# Patient Record
Sex: MALE | Race: WHITE | NOT HISPANIC OR LATINO | Employment: OTHER | URBAN - METROPOLITAN AREA
[De-identification: names, ages, dates, MRNs, and addresses within clinical notes are randomized per-mention and may not be internally consistent; named-entity substitution may affect disease eponyms.]

---

## 2023-05-01 ENCOUNTER — TELEPHONE (OUTPATIENT)
Dept: PRIMARY CARE | Facility: CLINIC | Age: 68
End: 2023-05-01
Payer: MEDICARE

## 2023-05-01 DIAGNOSIS — I10 PRIMARY HYPERTENSION: Primary | ICD-10-CM

## 2023-05-01 DIAGNOSIS — K21.9 GASTROESOPHAGEAL REFLUX DISEASE WITHOUT ESOPHAGITIS: ICD-10-CM

## 2023-05-01 RX ORDER — APIXABAN 5 MG/1
1 TABLET, FILM COATED ORAL 2 TIMES DAILY
COMMUNITY
Start: 2022-06-13 | End: 2023-09-22 | Stop reason: ALTCHOICE

## 2023-05-01 RX ORDER — PANTOPRAZOLE SODIUM 40 MG/1
1 TABLET, DELAYED RELEASE ORAL
COMMUNITY
Start: 2017-09-29 | End: 2023-05-01 | Stop reason: SDUPTHER

## 2023-05-01 RX ORDER — ALPRAZOLAM 0.5 MG/1
1 TABLET ORAL 3 TIMES DAILY PRN
COMMUNITY
Start: 2021-10-06 | End: 2023-07-26 | Stop reason: SDUPTHER

## 2023-05-01 RX ORDER — METOPROLOL SUCCINATE 100 MG/1
1 TABLET, EXTENDED RELEASE ORAL DAILY
COMMUNITY
Start: 2014-04-28 | End: 2023-05-01 | Stop reason: SDUPTHER

## 2023-05-01 RX ORDER — METOPROLOL SUCCINATE 100 MG/1
100 TABLET, EXTENDED RELEASE ORAL DAILY
Qty: 90 TABLET | Refills: 3 | Status: SHIPPED | OUTPATIENT
Start: 2023-05-01 | End: 2024-04-29

## 2023-05-01 RX ORDER — FAMOTIDINE 20 MG/1
1 TABLET, FILM COATED ORAL NIGHTLY
COMMUNITY
Start: 2022-06-24 | End: 2024-02-07 | Stop reason: SDUPTHER

## 2023-05-01 RX ORDER — PANTOPRAZOLE SODIUM 40 MG/1
40 TABLET, DELAYED RELEASE ORAL
Qty: 180 TABLET | Refills: 3 | Status: SHIPPED | OUTPATIENT
Start: 2023-05-01 | End: 2024-04-15 | Stop reason: SDUPTHER

## 2023-05-01 NOTE — TELEPHONE ENCOUNTER
Patient requesting a refill on Metoprolol 100mg and pantoprazole 40mg to [facility]

## 2023-05-17 PROBLEM — M48.061 SPINAL STENOSIS OF LUMBAR REGION: Status: ACTIVE | Noted: 2023-05-17

## 2023-05-17 PROBLEM — K21.9 GASTROESOPHAGEAL REFLUX DISEASE WITHOUT ESOPHAGITIS: Status: ACTIVE | Noted: 2023-05-17

## 2023-05-17 PROBLEM — D12.6 TUBULAR ADENOMA OF COLON: Status: ACTIVE | Noted: 2023-05-17

## 2023-05-17 PROBLEM — I48.20 CHRONIC ATRIAL FIBRILLATION (MULTI): Status: ACTIVE | Noted: 2023-05-17

## 2023-05-17 PROBLEM — I47.9 PAROXYSMAL TACHYCARDIA (MULTI): Status: RESOLVED | Noted: 2023-05-17 | Resolved: 2023-05-17

## 2023-05-17 PROBLEM — M79.7 FIBROMYALGIA: Status: ACTIVE | Noted: 2023-05-17

## 2023-05-17 PROBLEM — I47.9 PAROXYSMAL TACHYCARDIA (MULTI): Status: ACTIVE | Noted: 2023-05-17

## 2023-05-17 PROBLEM — M79.2 NEUROPATHIC PAIN: Status: ACTIVE | Noted: 2023-05-17

## 2023-05-17 PROBLEM — I10 PRIMARY HYPERTENSION: Status: ACTIVE | Noted: 2023-05-17

## 2023-05-17 NOTE — PROGRESS NOTES
Subjective   Patient ID: Anirudh Qiu is a 68 y.o. male who presents for Back Pain.    Is here to follow-up on hypertension, fibromyalgia, neuropathic pain spinal stenosis of the lumbar spine, GERD, and a history of atrial fibrillation.  His radiofrequency ablation seems to be successful as he remains in sinus rhythm.    His physical health has been good other than the severe upper back sensitivity.  We discussed getting another opinion from physical medicine and rehabilitation         Review of Systems   Cardiovascular:         See HPI   Neurological:         See HPI   All other systems reviewed and are negative.      Objective   /82 (BP Location: Left arm, Patient Position: Sitting, BP Cuff Size: Large adult)   Pulse 74   Temp 36.2 °C (97.2 °F) (Temporal)   SpO2 99%     Physical Exam  Constitutional:       Appearance: Normal appearance.   HENT:      Head: Normocephalic and atraumatic.   Cardiovascular:      Rate and Rhythm: Normal rate and regular rhythm.   Pulmonary:      Effort: Pulmonary effort is normal.      Breath sounds: Normal breath sounds.   Musculoskeletal:      Cervical back: Normal range of motion and neck supple.   Neurological:      General: No focal deficit present.      Mental Status: He is alert and oriented to person, place, and time.   Psychiatric:         Mood and Affect: Mood normal.         Behavior: Behavior normal.         Thought Content: Thought content normal.         Judgment: Judgment normal.         Assessment/Plan   Problem List Items Addressed This Visit       Primary hypertension    Fibromyalgia    Neuropathic pain    Relevant Orders    Referral to Pain Medicine    Spinal stenosis of lumbar region - Primary    Gastroesophageal reflux disease without esophagitis    Chronic atrial fibrillation (CMS/HCC)     Followed by cardiology and is anticoagulated        I am referring him to physical medicine and rehabilitation for a new idea to help with his ultrasensitive upper  back pain.  I will follow-up with him in 4 months and we will update his chemistry at that time.

## 2023-05-18 ENCOUNTER — OFFICE VISIT (OUTPATIENT)
Dept: PRIMARY CARE | Facility: CLINIC | Age: 68
End: 2023-05-18
Payer: MEDICARE

## 2023-05-18 VITALS
HEART RATE: 74 BPM | TEMPERATURE: 97.2 F | SYSTOLIC BLOOD PRESSURE: 130 MMHG | DIASTOLIC BLOOD PRESSURE: 82 MMHG | OXYGEN SATURATION: 99 %

## 2023-05-18 DIAGNOSIS — I48.20 CHRONIC ATRIAL FIBRILLATION (MULTI): ICD-10-CM

## 2023-05-18 DIAGNOSIS — M79.7 FIBROMYALGIA: ICD-10-CM

## 2023-05-18 DIAGNOSIS — M79.2 NEUROPATHIC PAIN: ICD-10-CM

## 2023-05-18 DIAGNOSIS — I10 PRIMARY HYPERTENSION: ICD-10-CM

## 2023-05-18 DIAGNOSIS — K21.9 GASTROESOPHAGEAL REFLUX DISEASE WITHOUT ESOPHAGITIS: ICD-10-CM

## 2023-05-18 DIAGNOSIS — M48.061 SPINAL STENOSIS OF LUMBAR REGION, UNSPECIFIED WHETHER NEUROGENIC CLAUDICATION PRESENT: Primary | ICD-10-CM

## 2023-05-18 PROCEDURE — 1160F RVW MEDS BY RX/DR IN RCRD: CPT | Performed by: FAMILY MEDICINE

## 2023-05-18 PROCEDURE — 3075F SYST BP GE 130 - 139MM HG: CPT | Performed by: FAMILY MEDICINE

## 2023-05-18 PROCEDURE — 3079F DIAST BP 80-89 MM HG: CPT | Performed by: FAMILY MEDICINE

## 2023-05-18 PROCEDURE — 1036F TOBACCO NON-USER: CPT | Performed by: FAMILY MEDICINE

## 2023-05-18 PROCEDURE — 1159F MED LIST DOCD IN RCRD: CPT | Performed by: FAMILY MEDICINE

## 2023-05-18 PROCEDURE — 99213 OFFICE O/P EST LOW 20 MIN: CPT | Performed by: FAMILY MEDICINE

## 2023-05-18 ASSESSMENT — PATIENT HEALTH QUESTIONNAIRE - PHQ9
SUM OF ALL RESPONSES TO PHQ9 QUESTIONS 1 AND 2: 0
2. FEELING DOWN, DEPRESSED OR HOPELESS: NOT AT ALL
1. LITTLE INTEREST OR PLEASURE IN DOING THINGS: NOT AT ALL

## 2023-05-18 NOTE — PATIENT INSTRUCTIONS
I am referring you to physical medicine and rehabilitation to hopefully have a new idea how to control the pain of your upper back.  I will see you back in 4 months and we will update your major blood chemistry at that time.

## 2023-07-26 DIAGNOSIS — F41.8 SITUATIONAL ANXIETY: Primary | ICD-10-CM

## 2023-07-26 RX ORDER — ALPRAZOLAM 0.5 MG/1
0.5 TABLET ORAL 3 TIMES DAILY PRN
Qty: 10 TABLET | Refills: 0 | Status: SHIPPED | OUTPATIENT
Start: 2023-07-26 | End: 2024-06-05 | Stop reason: SDUPTHER

## 2023-09-19 ENCOUNTER — APPOINTMENT (OUTPATIENT)
Dept: PRIMARY CARE | Facility: CLINIC | Age: 68
End: 2023-09-19
Payer: MEDICARE

## 2023-09-20 ASSESSMENT — ENCOUNTER SYMPTOMS
TINGLING: 0
ABDOMINAL PAIN: 0
PERIANAL NUMBNESS: 0
PARESTHESIAS: 0
PARESIS: 0
HEADACHES: 0
BOWEL INCONTINENCE: 0
BACK PAIN: 1
FEVER: 0
WEIGHT LOSS: 0
NUMBNESS: 0
LEG PAIN: 1
DYSURIA: 0
WEAKNESS: 0

## 2023-09-22 ENCOUNTER — OFFICE VISIT (OUTPATIENT)
Dept: PRIMARY CARE | Facility: CLINIC | Age: 68
End: 2023-09-22
Payer: MEDICARE

## 2023-09-22 VITALS — TEMPERATURE: 98 F | SYSTOLIC BLOOD PRESSURE: 142 MMHG | DIASTOLIC BLOOD PRESSURE: 82 MMHG

## 2023-09-22 DIAGNOSIS — M54.6 CHRONIC THORACIC BACK PAIN, UNSPECIFIED BACK PAIN LATERALITY: ICD-10-CM

## 2023-09-22 DIAGNOSIS — Z23 NEEDS FLU SHOT: ICD-10-CM

## 2023-09-22 DIAGNOSIS — M79.89 LEFT LEG SWELLING: ICD-10-CM

## 2023-09-22 DIAGNOSIS — M79.605 LEFT LEG PAIN: ICD-10-CM

## 2023-09-22 DIAGNOSIS — Z00.00 ROUTINE GENERAL MEDICAL EXAMINATION AT A HEALTH CARE FACILITY: Primary | ICD-10-CM

## 2023-09-22 DIAGNOSIS — G89.29 CHRONIC THORACIC BACK PAIN, UNSPECIFIED BACK PAIN LATERALITY: ICD-10-CM

## 2023-09-22 PROBLEM — I48.0 PAROXYSMAL ATRIAL FIBRILLATION (MULTI): Status: ACTIVE | Noted: 2023-09-22

## 2023-09-22 PROBLEM — E04.9 GOITER: Status: ACTIVE | Noted: 2021-07-01

## 2023-09-22 PROBLEM — I10 BENIGN ESSENTIAL HYPERTENSION: Status: ACTIVE | Noted: 2023-09-22

## 2023-09-22 PROBLEM — S16.1XXA CERVICAL STRAIN: Status: ACTIVE | Noted: 2023-09-22

## 2023-09-22 PROBLEM — E78.00 ELEVATED LDL CHOLESTEROL LEVEL: Status: ACTIVE | Noted: 2023-09-22

## 2023-09-22 PROBLEM — S39.012A LUMBAR STRAIN: Status: ACTIVE | Noted: 2023-09-22

## 2023-09-22 PROBLEM — J34.2 NASAL SEPTAL DEVIATION: Status: ACTIVE | Noted: 2023-09-22

## 2023-09-22 PROBLEM — S86.819A STRAIN OF CALF MUSCLE: Status: ACTIVE | Noted: 2023-09-22

## 2023-09-22 PROBLEM — K29.60 BILE-INDUCED GASTRITIS: Status: ACTIVE | Noted: 2023-09-22

## 2023-09-22 PROBLEM — R49.8 OTHER VOICE AND RESONANCE DISORDERS: Status: ACTIVE | Noted: 2021-07-01

## 2023-09-22 PROBLEM — K80.20 CALCULUS OF GALLBLADDER WITHOUT CHOLECYSTITIS WITHOUT OBSTRUCTION: Status: ACTIVE | Noted: 2023-09-22

## 2023-09-22 PROBLEM — R13.12 DYSPHAGIA, OROPHARYNGEAL PHASE: Status: ACTIVE | Noted: 2023-09-22

## 2023-09-22 PROBLEM — E06.9 THYROIDITIS: Status: ACTIVE | Noted: 2021-07-01

## 2023-09-22 PROBLEM — L20.9 ATOPIC DERMATITIS OF SCALP: Status: ACTIVE | Noted: 2023-09-22

## 2023-09-22 PROBLEM — B37.0 CANDIDIASIS OF MOUTH: Status: ACTIVE | Noted: 2021-07-01

## 2023-09-22 PROBLEM — F43.21 SITUATIONAL DEPRESSION: Status: ACTIVE | Noted: 2023-09-22

## 2023-09-22 PROBLEM — K14.6 BURNING MOUTH SYNDROME: Status: ACTIVE | Noted: 2021-08-02

## 2023-09-22 PROBLEM — I48.19 PERSISTENT ATRIAL FIBRILLATION (MULTI): Status: ACTIVE | Noted: 2023-09-22

## 2023-09-22 PROBLEM — M79.662 PAIN AND SWELLING OF LEFT LOWER LEG: Status: ACTIVE | Noted: 2023-09-22

## 2023-09-22 PROBLEM — M54.14 RADICULAR PAIN OF THORACIC REGION: Status: ACTIVE | Noted: 2023-09-22

## 2023-09-22 PROBLEM — M54.12 CERVICAL RADICULOPATHY: Status: ACTIVE | Noted: 2023-09-22

## 2023-09-22 PROBLEM — K21.9 GERD (GASTROESOPHAGEAL REFLUX DISEASE): Status: ACTIVE | Noted: 2023-09-22

## 2023-09-22 PROBLEM — S29.019A STRAIN OF THORACIC REGION: Status: ACTIVE | Noted: 2023-09-22

## 2023-09-22 PROBLEM — G60.9 IDIOPATHIC PERIPHERAL NEUROPATHY: Status: ACTIVE | Noted: 2023-09-22

## 2023-09-22 PROBLEM — M48.00 SPINAL STENOSIS, MULTILEVEL: Status: ACTIVE | Noted: 2023-09-22

## 2023-09-22 PROBLEM — M54.9 UPPER BACK PAIN, CHRONIC: Status: ACTIVE | Noted: 2023-09-22

## 2023-09-22 PROBLEM — F41.8 SITUATIONAL ANXIETY: Status: ACTIVE | Noted: 2023-09-22

## 2023-09-22 PROBLEM — M79.18 MYOFASCIAL PAIN SYNDROME: Status: ACTIVE | Noted: 2023-09-22

## 2023-09-22 PROBLEM — K31.84 GASTRIC STASIS: Status: ACTIVE | Noted: 2023-09-22

## 2023-09-22 PROBLEM — M62.838 MUSCLE SPASTICITY: Status: ACTIVE | Noted: 2023-09-22

## 2023-09-22 PROBLEM — R35.1 NOCTURIA: Status: ACTIVE | Noted: 2023-09-22

## 2023-09-22 PROBLEM — M50.30 DEGENERATIVE CERVICAL DISC: Status: ACTIVE | Noted: 2023-09-22

## 2023-09-22 PROBLEM — I47.10 PSVT (PAROXYSMAL SUPRAVENTRICULAR TACHYCARDIA) (CMS-HCC): Status: ACTIVE | Noted: 2023-09-22

## 2023-09-22 PROCEDURE — 1036F TOBACCO NON-USER: CPT | Performed by: NURSE PRACTITIONER

## 2023-09-22 PROCEDURE — 1160F RVW MEDS BY RX/DR IN RCRD: CPT | Performed by: NURSE PRACTITIONER

## 2023-09-22 PROCEDURE — G0008 ADMIN INFLUENZA VIRUS VAC: HCPCS | Performed by: NURSE PRACTITIONER

## 2023-09-22 PROCEDURE — 1159F MED LIST DOCD IN RCRD: CPT | Performed by: NURSE PRACTITIONER

## 2023-09-22 PROCEDURE — 3077F SYST BP >= 140 MM HG: CPT | Performed by: NURSE PRACTITIONER

## 2023-09-22 PROCEDURE — 3079F DIAST BP 80-89 MM HG: CPT | Performed by: NURSE PRACTITIONER

## 2023-09-22 PROCEDURE — G0439 PPPS, SUBSEQ VISIT: HCPCS | Performed by: NURSE PRACTITIONER

## 2023-09-22 PROCEDURE — 90662 IIV NO PRSV INCREASED AG IM: CPT | Performed by: NURSE PRACTITIONER

## 2023-09-22 PROCEDURE — 99213 OFFICE O/P EST LOW 20 MIN: CPT | Performed by: NURSE PRACTITIONER

## 2023-09-22 RX ORDER — OXYCODONE AND ACETAMINOPHEN 5; 325 MG/1; MG/1
1 TABLET ORAL
COMMUNITY
Start: 2023-07-28 | End: 2024-01-23 | Stop reason: WASHOUT

## 2023-09-22 RX ORDER — ACETAMINOPHEN 325 MG/1
500 TABLET ORAL
COMMUNITY

## 2023-09-22 ASSESSMENT — PATIENT HEALTH QUESTIONNAIRE - PHQ9
1. LITTLE INTEREST OR PLEASURE IN DOING THINGS: NOT AT ALL
2. FEELING DOWN, DEPRESSED OR HOPELESS: NOT AT ALL
SUM OF ALL RESPONSES TO PHQ9 QUESTIONS 1 AND 2: 0

## 2023-09-22 NOTE — PATIENT INSTRUCTIONS
STAT U/S left lower extremity  High dose flu shot today  LUDMILA compression stockings - please call to be fitted DDM or [employer] (Check with insurance first)  Plan to follow up in 4 months - fasting labs before.

## 2023-09-22 NOTE — PROGRESS NOTES
Subjective   Patient ID: Anirudh Qiu is a 68 y.o. male who presents for Follow-up (Pain in calves, swelling in the left foot).  Medicare Wellness  Back Pain  This is a chronic problem. The current episode started more than 1 year ago. The problem occurs constantly. The problem has been waxing and waning since onset. The pain is present in the thoracic spine. The quality of the pain is described as aching and stabbing. The pain does not radiate. The pain is at a severity of 6/10. The pain is The same all the time. The symptoms are aggravated by position. Associated symptoms include leg pain. Pertinent negatives include no abdominal pain, bladder incontinence, bowel incontinence, chest pain, dysuria, fever, headaches, numbness, paresis, paresthesias, pelvic pain, perianal numbness, tingling, weakness or weight loss. Risk factors include history of steroid use and obesity.       Below is the patient's most recent value for Albumin, ALT, AST, BUN, Calcium, Chloride, Cholesterol, CO2, Creatinine, GFR, Glucose, HDL, Hematocrit, Hemoglobin, Hemoglobin A1C, LDL, Magnesium, Phosphorus, Platelets, Potassium, PSA, Sodium, Triglycerides, and WBC.   Lab Results   Component Value Date    ALBUMIN 4.0 06/07/2022    ALT 25 06/07/2022    AST 15 06/07/2022    BUN 19 12/05/2022    CALCIUM 8.6 12/05/2022     12/05/2022    CHOL 156 06/07/2022    CO2 30 12/05/2022    CREATININE 1.09 12/05/2022    HDL 45.5 06/07/2022    HCT 45.8 12/05/2022    HGB 14.7 12/05/2022    MG 2.65 (H) 05/13/2020    PHOS 3.7 09/26/2019     12/05/2022    K 4.2 12/05/2022    PSA 3.68 06/07/2022     12/05/2022    TRIG 88 06/07/2022    WBC 6.7 12/05/2022     Note: for a comprehensive list of the patient's lab results, access the Results Review activity.Subjective   Reason for Visit: Anirudh Qiu is an 68 y.o. male here for a Medicare Wellness visit.    Answers submitted by the patient for this visit:  Back Pain Questionnaire (Submitted on  9/20/2023)  Chief Complaint: Back pain  Chronicity: chronic  Onset: more than 1 year ago  Frequency: constantly  Progression since onset: waxing and waning  Pain location: thoracic spine  Pain quality: aching, stabbing  Radiates to: does not radiate  Pain - numeric: 6/10  Pain is: the same all the time  Aggravated by: position  abdominal pain: No  bladder incontinence: No  bowel incontinence: No  chest pain: No  dysuria: No  fever: No  headaches: No  leg pain: Yes  numbness: No  paresis: No  paresthesias: No  pelvic pain: No  perianal numbness: No  tingling: No  weakness: No  weight loss: No  Risk factors: history of steroid use, obesity      Back pain   Touch sensitivity on back has improved since shoulder surgery.    Jose  - right shoulder reversal  July 31 surgery  There is a nerve block that could be done in upper back - since nerve block was helpful perioperatively.    Apt with Dr. Lopez for back - upper back and cervical   Stenosis 5-6, & 6-7  [facility] Phase 2 of PT for shoulder       Review of Systems   Constitutional:  Negative for fever and weight loss.   Cardiovascular:  Negative for chest pain.   Gastrointestinal:  Negative for abdominal pain and bowel incontinence.   Genitourinary:  Negative for bladder incontinence, dysuria and pelvic pain.   Musculoskeletal:  Positive for back pain.   Neurological:  Negative for tingling, weakness, numbness, headaches and paresthesias.   All other systems reviewed and are negative.      Objective   Wife, Marcia present today.  Physical Exam  Vitals reviewed. Exam conducted with a chaperone present.   Constitutional:       Appearance: Normal appearance.   HENT:      Head: Normocephalic and atraumatic.      Right Ear: Tympanic membrane, ear canal and external ear normal.      Left Ear: Tympanic membrane, ear canal and external ear normal.      Nose: Nose normal.      Mouth/Throat:      Mouth: Mucous membranes are moist.      Pharynx: Oropharynx is clear.   Eyes:       Extraocular Movements: Extraocular movements intact.      Conjunctiva/sclera: Conjunctivae normal.      Pupils: Pupils are equal, round, and reactive to light.   Cardiovascular:      Rate and Rhythm: Normal rate and regular rhythm.      Pulses: Normal pulses.      Heart sounds: Normal heart sounds.   Pulmonary:      Effort: Pulmonary effort is normal.      Breath sounds: Normal breath sounds.   Abdominal:      General: Bowel sounds are normal.      Palpations: Abdomen is soft.   Genitourinary:     Comments: Deferred  Musculoskeletal:      Cervical back: Normal range of motion and neck supple.      Right lower leg: Edema present.      Left lower leg: Edema present.      Comments: LUDMILA   lower leg edema  - left 2+ and pain in posterior calf   Lymphadenopathy:      Cervical: No cervical adenopathy.   Skin:     General: Skin is warm and dry.   Neurological:      General: No focal deficit present.      Mental Status: He is alert and oriented to person, place, and time. Mental status is at baseline.   Psychiatric:         Mood and Affect: Mood normal.         Behavior: Behavior normal.         Thought Content: Thought content normal.         Judgment: Judgment normal.         Assessment/Plan   Problem List Items Addressed This Visit    None  Visit Diagnoses       Left leg swelling    -  Primary    Relevant Orders    Vascular US Lower Extremity Venous Duplex Left (Completed)    Left leg pain        Relevant Orders    Vascular US Lower Extremity Venous Duplex Left (Completed)    Needs flu shot        Relevant Orders    Flu vaccine, quadrivalent, high-dose, preservative free, age 65y+ (FLUZONE) (Completed)

## 2023-10-01 DIAGNOSIS — E78.2 MIXED HYPERLIPIDEMIA: ICD-10-CM

## 2023-10-01 DIAGNOSIS — R73.09 ELEVATED GLUCOSE: ICD-10-CM

## 2023-10-01 DIAGNOSIS — I48.20 CHRONIC ATRIAL FIBRILLATION (MULTI): ICD-10-CM

## 2023-10-01 DIAGNOSIS — I10 PRIMARY HYPERTENSION: Primary | ICD-10-CM

## 2023-10-01 DIAGNOSIS — R35.1 NOCTURIA: ICD-10-CM

## 2023-10-01 PROBLEM — Z23 NEEDS FLU SHOT: Status: ACTIVE | Noted: 2023-10-01

## 2023-10-01 PROBLEM — Z00.00 ROUTINE GENERAL MEDICAL EXAMINATION AT A HEALTH CARE FACILITY: Status: ACTIVE | Noted: 2023-10-01

## 2023-10-01 PROBLEM — M79.605 LEFT LEG PAIN: Status: ACTIVE | Noted: 2023-09-22

## 2023-10-01 PROBLEM — M79.89 LEFT LEG SWELLING: Status: ACTIVE | Noted: 2023-10-01

## 2023-10-01 ASSESSMENT — ENCOUNTER SYMPTOMS
WEAKNESS: 0
WEIGHT LOSS: 0
DYSURIA: 0
BOWEL INCONTINENCE: 0
BACK PAIN: 1
LEG PAIN: 1
PARESTHESIAS: 0
HEADACHES: 0
PARESIS: 0
FEVER: 0
TINGLING: 0
ABDOMINAL PAIN: 0
NUMBNESS: 0
PERIANAL NUMBNESS: 0

## 2024-01-15 ENCOUNTER — LAB (OUTPATIENT)
Dept: LAB | Facility: LAB | Age: 69
End: 2024-01-15
Payer: MEDICARE

## 2024-01-15 DIAGNOSIS — R35.1 NOCTURIA: ICD-10-CM

## 2024-01-15 DIAGNOSIS — I48.20 CHRONIC ATRIAL FIBRILLATION (MULTI): ICD-10-CM

## 2024-01-15 DIAGNOSIS — E78.2 MIXED HYPERLIPIDEMIA: ICD-10-CM

## 2024-01-15 DIAGNOSIS — R73.09 ELEVATED GLUCOSE: ICD-10-CM

## 2024-01-15 DIAGNOSIS — I10 PRIMARY HYPERTENSION: ICD-10-CM

## 2024-01-15 LAB
ALBUMIN SERPL BCP-MCNC: 4.2 G/DL (ref 3.4–5)
ALP SERPL-CCNC: 39 U/L (ref 33–136)
ALT SERPL W P-5'-P-CCNC: 10 U/L (ref 10–52)
ANION GAP SERPL CALC-SCNC: 11 MMOL/L (ref 10–20)
AST SERPL W P-5'-P-CCNC: 15 U/L (ref 9–39)
BASOPHILS # BLD AUTO: 0.02 X10*3/UL (ref 0–0.1)
BASOPHILS NFR BLD AUTO: 0.3 %
BILIRUB SERPL-MCNC: 0.5 MG/DL (ref 0–1.2)
BUN SERPL-MCNC: 22 MG/DL (ref 6–23)
CALCIUM SERPL-MCNC: 9.1 MG/DL (ref 8.6–10.3)
CHLORIDE SERPL-SCNC: 106 MMOL/L (ref 98–107)
CHOLEST SERPL-MCNC: 153 MG/DL (ref 0–199)
CHOLESTEROL/HDL RATIO: 4.2
CO2 SERPL-SCNC: 28 MMOL/L (ref 21–32)
CREAT SERPL-MCNC: 1.17 MG/DL (ref 0.5–1.3)
EGFRCR SERPLBLD CKD-EPI 2021: 69 ML/MIN/1.73M*2
EOSINOPHIL # BLD AUTO: 0.11 X10*3/UL (ref 0–0.7)
EOSINOPHIL NFR BLD AUTO: 1.7 %
ERYTHROCYTE [DISTWIDTH] IN BLOOD BY AUTOMATED COUNT: 13.6 % (ref 11.5–14.5)
EST. AVERAGE GLUCOSE BLD GHB EST-MCNC: 108 MG/DL
GLUCOSE SERPL-MCNC: 92 MG/DL (ref 74–99)
HBA1C MFR BLD: 5.4 %
HCT VFR BLD AUTO: 47.5 % (ref 41–52)
HDLC SERPL-MCNC: 36.7 MG/DL
HGB BLD-MCNC: 15.4 G/DL (ref 13.5–17.5)
IMM GRANULOCYTES # BLD AUTO: 0.01 X10*3/UL (ref 0–0.7)
IMM GRANULOCYTES NFR BLD AUTO: 0.2 % (ref 0–0.9)
LDLC SERPL CALC-MCNC: 100 MG/DL
LYMPHOCYTES # BLD AUTO: 1.55 X10*3/UL (ref 1.2–4.8)
LYMPHOCYTES NFR BLD AUTO: 24.4 %
MCH RBC QN AUTO: 29.1 PG (ref 26–34)
MCHC RBC AUTO-ENTMCNC: 32.4 G/DL (ref 32–36)
MCV RBC AUTO: 90 FL (ref 80–100)
MONOCYTES # BLD AUTO: 0.55 X10*3/UL (ref 0.1–1)
MONOCYTES NFR BLD AUTO: 8.7 %
NEUTROPHILS # BLD AUTO: 4.1 X10*3/UL (ref 1.2–7.7)
NEUTROPHILS NFR BLD AUTO: 64.7 %
NON HDL CHOLESTEROL: 116 MG/DL (ref 0–149)
NRBC BLD-RTO: 0 /100 WBCS (ref 0–0)
PLATELET # BLD AUTO: 167 X10*3/UL (ref 150–450)
POTASSIUM SERPL-SCNC: 4.3 MMOL/L (ref 3.5–5.3)
PROT SERPL-MCNC: 6.7 G/DL (ref 6.4–8.2)
PSA SERPL-MCNC: 5.43 NG/ML
RBC # BLD AUTO: 5.3 X10*6/UL (ref 4.5–5.9)
SODIUM SERPL-SCNC: 141 MMOL/L (ref 136–145)
TRIGL SERPL-MCNC: 84 MG/DL (ref 0–149)
TSH SERPL-ACNC: 1.24 MIU/L (ref 0.44–3.98)
VLDL: 17 MG/DL (ref 0–40)
WBC # BLD AUTO: 6.3 X10*3/UL (ref 4.4–11.3)

## 2024-01-15 PROCEDURE — 80061 LIPID PANEL: CPT

## 2024-01-15 PROCEDURE — 84153 ASSAY OF PSA TOTAL: CPT

## 2024-01-15 PROCEDURE — 83036 HEMOGLOBIN GLYCOSYLATED A1C: CPT

## 2024-01-15 PROCEDURE — 85025 COMPLETE CBC W/AUTO DIFF WBC: CPT

## 2024-01-15 PROCEDURE — 80053 COMPREHEN METABOLIC PANEL: CPT

## 2024-01-15 PROCEDURE — 84443 ASSAY THYROID STIM HORMONE: CPT

## 2024-01-15 PROCEDURE — 36415 COLL VENOUS BLD VENIPUNCTURE: CPT

## 2024-01-23 ENCOUNTER — LAB (OUTPATIENT)
Dept: LAB | Facility: LAB | Age: 69
End: 2024-01-23
Payer: MEDICARE

## 2024-01-23 ENCOUNTER — OFFICE VISIT (OUTPATIENT)
Dept: PRIMARY CARE | Facility: CLINIC | Age: 69
End: 2024-01-23
Payer: MEDICARE

## 2024-01-23 VITALS
OXYGEN SATURATION: 96 % | TEMPERATURE: 97.9 F | SYSTOLIC BLOOD PRESSURE: 124 MMHG | HEART RATE: 76 BPM | DIASTOLIC BLOOD PRESSURE: 84 MMHG

## 2024-01-23 DIAGNOSIS — G89.29 NECK PAIN, CHRONIC: ICD-10-CM

## 2024-01-23 DIAGNOSIS — M54.2 NECK PAIN, CHRONIC: ICD-10-CM

## 2024-01-23 DIAGNOSIS — E78.2 MIXED HYPERLIPIDEMIA: ICD-10-CM

## 2024-01-23 DIAGNOSIS — R97.20 ELEVATED PSA: ICD-10-CM

## 2024-01-23 DIAGNOSIS — I10 BENIGN ESSENTIAL HYPERTENSION: ICD-10-CM

## 2024-01-23 DIAGNOSIS — R97.20 ELEVATED PSA: Primary | ICD-10-CM

## 2024-01-23 PROBLEM — K31.84 GASTROPARESIS: Status: ACTIVE | Noted: 2023-09-22

## 2024-01-23 PROBLEM — F32.9 REACTIVE DEPRESSION: Status: ACTIVE | Noted: 2023-09-22

## 2024-01-23 PROBLEM — R25.2 SPASTICITY: Status: ACTIVE | Noted: 2024-01-23

## 2024-01-23 PROBLEM — E66.9 OBESITY: Status: ACTIVE | Noted: 2024-01-23

## 2024-01-23 PROBLEM — F41.9 ANXIETY: Status: ACTIVE | Noted: 2022-12-14

## 2024-01-23 PROBLEM — K80.20 CHOLELITHIASIS WITHOUT OBSTRUCTION: Status: ACTIVE | Noted: 2023-03-17

## 2024-01-23 PROBLEM — M79.18 MYOFASCIAL PAIN DYSFUNCTION SYNDROME: Status: ACTIVE | Noted: 2023-09-22

## 2024-01-23 PROBLEM — J34.2 DEVIATED NASAL SEPTUM: Status: ACTIVE | Noted: 2023-09-22

## 2024-01-23 PROBLEM — E78.00 ELEVATED LOW DENSITY LIPOPROTEIN (LDL) CHOLESTEROL LEVEL: Status: ACTIVE | Noted: 2023-09-22

## 2024-01-23 PROBLEM — D12.6 ADENOMA OF LARGE INTESTINE: Status: ACTIVE | Noted: 2024-01-23

## 2024-01-23 PROBLEM — M54.50 LOW BACK PAIN, UNSPECIFIED: Status: ACTIVE | Noted: 2022-07-22

## 2024-01-23 PROBLEM — R13.12 OROPHARYNGEAL DYSPHAGIA: Status: ACTIVE | Noted: 2023-09-22

## 2024-01-23 PROBLEM — J31.2 CHRONIC SORE THROAT: Status: ACTIVE | Noted: 2024-01-23

## 2024-01-23 PROBLEM — Z86.79 HISTORY OF HYPERTENSION: Status: ACTIVE | Noted: 2024-01-23

## 2024-01-23 PROBLEM — M79.89 SWELLING OF LEFT LOWER EXTREMITY: Status: ACTIVE | Noted: 2023-10-01

## 2024-01-23 PROBLEM — M79.605 PAIN OF LEFT LOWER EXTREMITY: Status: ACTIVE | Noted: 2023-09-22

## 2024-01-23 PROBLEM — K21.9 GASTROESOPHAGEAL REFLUX DISEASE: Status: ACTIVE | Noted: 2022-12-14

## 2024-01-23 PROBLEM — M54.9 CHRONIC BACK PAIN: Status: ACTIVE | Noted: 2024-01-23

## 2024-01-23 PROBLEM — I48.91 ATRIAL FIBRILLATION (MULTI): Status: ACTIVE | Noted: 2022-06-07

## 2024-01-23 LAB — PSA SERPL-MCNC: 6.49 NG/ML

## 2024-01-23 PROCEDURE — 99214 OFFICE O/P EST MOD 30 MIN: CPT | Performed by: NURSE PRACTITIONER

## 2024-01-23 PROCEDURE — 36415 COLL VENOUS BLD VENIPUNCTURE: CPT

## 2024-01-23 PROCEDURE — 3074F SYST BP LT 130 MM HG: CPT | Performed by: NURSE PRACTITIONER

## 2024-01-23 PROCEDURE — 1036F TOBACCO NON-USER: CPT | Performed by: NURSE PRACTITIONER

## 2024-01-23 PROCEDURE — 84153 ASSAY OF PSA TOTAL: CPT

## 2024-01-23 PROCEDURE — 1160F RVW MEDS BY RX/DR IN RCRD: CPT | Performed by: NURSE PRACTITIONER

## 2024-01-23 PROCEDURE — 3079F DIAST BP 80-89 MM HG: CPT | Performed by: NURSE PRACTITIONER

## 2024-01-23 PROCEDURE — 1159F MED LIST DOCD IN RCRD: CPT | Performed by: NURSE PRACTITIONER

## 2024-01-23 ASSESSMENT — PATIENT HEALTH QUESTIONNAIRE - PHQ9
SUM OF ALL RESPONSES TO PHQ9 QUESTIONS 1 AND 2: 0
1. LITTLE INTEREST OR PLEASURE IN DOING THINGS: NOT AT ALL
2. FEELING DOWN, DEPRESSED OR HOPELESS: NOT AT ALL

## 2024-01-23 NOTE — PROGRESS NOTES
Subjective   Patient ID: Anirudh Qiu is a 68 y.o. male who presents for Follow-up (Review labs).       Recent Results (from the past 1008 hour(s))   Comprehensive Metabolic Panel    Collection Time: 01/15/24  8:27 AM   Result Value Ref Range    Glucose 92 74 - 99 mg/dL    Sodium 141 136 - 145 mmol/L    Potassium 4.3 3.5 - 5.3 mmol/L    Chloride 106 98 - 107 mmol/L    Bicarbonate 28 21 - 32 mmol/L    Anion Gap 11 10 - 20 mmol/L    Urea Nitrogen 22 6 - 23 mg/dL    Creatinine 1.17 0.50 - 1.30 mg/dL    eGFR 69 >60 mL/min/1.73m*2    Calcium 9.1 8.6 - 10.3 mg/dL    Albumin 4.2 3.4 - 5.0 g/dL    Alkaline Phosphatase 39 33 - 136 U/L    Total Protein 6.7 6.4 - 8.2 g/dL    AST 15 9 - 39 U/L    Bilirubin, Total 0.5 0.0 - 1.2 mg/dL    ALT 10 10 - 52 U/L   Hemoglobin A1C    Collection Time: 01/15/24  8:27 AM   Result Value Ref Range    Hemoglobin A1C 5.4 see below %    Estimated Average Glucose 108 Not Established mg/dL   Lipid Panel    Collection Time: 01/15/24  8:27 AM   Result Value Ref Range    Cholesterol 153 0 - 199 mg/dL    HDL-Cholesterol 36.7 mg/dL    Cholesterol/HDL Ratio 4.2     LDL Calculated 100 (H) <=99 mg/dL    VLDL 17 0 - 40 mg/dL    Triglycerides 84 0 - 149 mg/dL    Non HDL Cholesterol 116 0 - 149 mg/dL   TSH with reflex to Free T4 if abnormal    Collection Time: 01/15/24  8:27 AM   Result Value Ref Range    Thyroid Stimulating Hormone 1.24 0.44 - 3.98 mIU/L   Prostate Specific Antigen    Collection Time: 01/15/24  8:27 AM   Result Value Ref Range    Prostate Specific AG 5.43 (H) <=4.00 ng/mL   CBC and Auto Differential    Collection Time: 01/15/24  8:27 AM   Result Value Ref Range    WBC 6.3 4.4 - 11.3 x10*3/uL    nRBC 0.0 0.0 - 0.0 /100 WBCs    RBC 5.30 4.50 - 5.90 x10*6/uL    Hemoglobin 15.4 13.5 - 17.5 g/dL    Hematocrit 47.5 41.0 - 52.0 %    MCV 90 80 - 100 fL    MCH 29.1 26.0 - 34.0 pg    MCHC 32.4 32.0 - 36.0 g/dL    RDW 13.6 11.5 - 14.5 %    Platelets 167 150 - 450 x10*3/uL    Neutrophils % 64.7 40.0  - 80.0 %    Immature Granulocytes %, Automated 0.2 0.0 - 0.9 %    Lymphocytes % 24.4 13.0 - 44.0 %    Monocytes % 8.7 2.0 - 10.0 %    Eosinophils % 1.7 0.0 - 6.0 %    Basophils % 0.3 0.0 - 2.0 %    Neutrophils Absolute 4.10 1.20 - 7.70 x10*3/uL    Immature Granulocytes Absolute, Automated 0.01 0.00 - 0.70 x10*3/uL    Lymphocytes Absolute 1.55 1.20 - 4.80 x10*3/uL    Monocytes Absolute 0.55 0.10 - 1.00 x10*3/uL    Eosinophils Absolute 0.11 0.00 - 0.70 x10*3/uL    Basophils Absolute 0.02 0.00 - 0.10 x10*3/uL           HPI  Shoulder replacement is still recovering.  12-15 pounds of weight reduction since the first of the year.  Eating 6 times per day.  Seeing Charo Sandoval, with Dr. Lopez about back/neck  Before the shoulder surgery had an MRI for thoracic and neck.  Had a nerve block on shoulder - with surgery and was pain free  Dr. Lopez suggested steroid shot in C6 and then neck started to hurt.  Then more pain below also.    Tried again at T6 and that seemed to make it even worse.    Dr. Martini did that and was not so helpful  Spinal stimulator was suggested  Had seen Dr. Wagoner at [facility].    Trigger point injections made the pain worse.  Saw Dr. Tran and did not have a great experience.    [facility] - Psychologist for pain mgmt  ~ 5 years ago  Pain is a 5-6 most of the time.     Prostate  - PSA is up  Some leakage after urination  Nocturia -0-1-2  Feels that there is a little residual after urination.    Urology - Saw Dr. Braga for a while but has not seen for some time.    Does not check BP at home.      Review of Systems   Constitutional:  Positive for activity change. Negative for unexpected weight change.   Genitourinary:  Positive for difficulty urinating and frequency.   Musculoskeletal:  Positive for neck pain.   Psychiatric/Behavioral:  The patient is nervous/anxious.          Objective   /84   Pulse 76   Temp 36.6 °C (97.9 °F)   SpO2 96%     Physical Exam  Vitals and nursing  note reviewed.   Constitutional:       Appearance: Normal appearance.   HENT:      Head: Normocephalic and atraumatic.      Right Ear: Tympanic membrane, ear canal and external ear normal.      Left Ear: Tympanic membrane, ear canal and external ear normal.      Mouth/Throat:      Pharynx: Oropharynx is clear.   Neck:      Thyroid: No thyroid mass, thyromegaly or thyroid tenderness.   Cardiovascular:      Rate and Rhythm: Normal rate and regular rhythm.      Pulses: Normal pulses.      Heart sounds: Normal heart sounds.   Pulmonary:      Effort: Pulmonary effort is normal.      Breath sounds: Normal breath sounds.   Abdominal:      General: Bowel sounds are normal.      Palpations: Abdomen is soft.   Neurological:      Mental Status: He is alert and oriented to person, place, and time.   Psychiatric:         Behavior: Behavior normal.         Thought Content: Thought content normal.         Judgment: Judgment normal.      Comments: Good eye contact, pleasant, mild anxiety noted         Assessment/Plan   Problem List Items Addressed This Visit             ICD-10-CM    Benign essential hypertension I10    Elevated PSA - Primary R97.20    Relevant Orders    Prostate Specific Antigen (Completed)    Mixed hyperlipidemia E78.2    Neck pain, chronic M54.2, G89.29            Chika Cordova, APRN-CNP

## 2024-01-23 NOTE — PATIENT INSTRUCTIONS
Keep up the good work with weight loss.    Recheck the PSA today.  Follow up based on the results.  Come and see me in four months.

## 2024-01-26 DIAGNOSIS — R97.20 ELEVATED PSA: Primary | ICD-10-CM

## 2024-01-26 DIAGNOSIS — R33.9 INCOMPLETE EMPTYING OF BLADDER: ICD-10-CM

## 2024-01-29 PROBLEM — R97.20 ELEVATED PSA: Status: ACTIVE | Noted: 2024-01-29

## 2024-01-29 PROBLEM — M54.2 NECK PAIN, CHRONIC: Status: ACTIVE | Noted: 2024-01-29

## 2024-01-29 PROBLEM — G89.29 NECK PAIN, CHRONIC: Status: ACTIVE | Noted: 2024-01-29

## 2024-01-29 PROBLEM — E78.2 MIXED HYPERLIPIDEMIA: Status: ACTIVE | Noted: 2024-01-29

## 2024-01-29 ASSESSMENT — ENCOUNTER SYMPTOMS
NECK PAIN: 1
NERVOUS/ANXIOUS: 1
DIFFICULTY URINATING: 1
FREQUENCY: 1
ACTIVITY CHANGE: 1
UNEXPECTED WEIGHT CHANGE: 0

## 2024-02-01 ENCOUNTER — HOSPITAL ENCOUNTER (OUTPATIENT)
Dept: RADIOLOGY | Facility: CLINIC | Age: 69
Discharge: HOME | End: 2024-02-01
Payer: MEDICARE

## 2024-02-01 DIAGNOSIS — R33.9 INCOMPLETE EMPTYING OF BLADDER: ICD-10-CM

## 2024-02-01 DIAGNOSIS — R97.20 ELEVATED PSA: ICD-10-CM

## 2024-02-01 PROCEDURE — 76857 US EXAM PELVIC LIMITED: CPT

## 2024-02-01 PROCEDURE — 76857 US EXAM PELVIC LIMITED: CPT | Performed by: RADIOLOGY

## 2024-02-07 DIAGNOSIS — K21.9 GASTROESOPHAGEAL REFLUX DISEASE, UNSPECIFIED WHETHER ESOPHAGITIS PRESENT: Primary | ICD-10-CM

## 2024-02-07 RX ORDER — FAMOTIDINE 20 MG/1
20 TABLET, FILM COATED ORAL NIGHTLY
Qty: 90 TABLET | Refills: 0 | Status: SHIPPED | OUTPATIENT
Start: 2024-02-07 | End: 2024-04-15 | Stop reason: SDUPTHER
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
## 2024-04-05 ENCOUNTER — OFFICE VISIT (OUTPATIENT)
Dept: UROLOGY | Facility: HOSPITAL | Age: 69
End: 2024-04-05
Payer: MEDICARE

## 2024-04-05 DIAGNOSIS — R97.20 ELEVATED PSA: ICD-10-CM

## 2024-04-05 DIAGNOSIS — N40.1 BENIGN PROSTATIC HYPERPLASIA WITH LOWER URINARY TRACT SYMPTOMS, SYMPTOM DETAILS UNSPECIFIED: Primary | ICD-10-CM

## 2024-04-05 LAB
APPEARANCE UR: CLEAR
BILIRUB UR STRIP.AUTO-MCNC: NEGATIVE MG/DL
COLOR UR: COLORLESS
GLUCOSE UR STRIP.AUTO-MCNC: NORMAL MG/DL
KETONES UR STRIP.AUTO-MCNC: NEGATIVE MG/DL
LEUKOCYTE ESTERASE UR QL STRIP.AUTO: NEGATIVE
NITRITE UR QL STRIP.AUTO: NEGATIVE
PH UR STRIP.AUTO: 6.5 [PH]
POC APPEARANCE, URINE: CLEAR
POC BILIRUBIN, URINE: NEGATIVE
POC BLOOD, URINE: ABNORMAL
POC COLOR, URINE: YELLOW
POC GLUCOSE, URINE: NEGATIVE MG/DL
POC KETONES, URINE: NEGATIVE MG/DL
POC LEUKOCYTES, URINE: NEGATIVE
POC NITRITE,URINE: NEGATIVE
POC PH, URINE: 7 PH
POC PROTEIN, URINE: NEGATIVE MG/DL
POC SPECIFIC GRAVITY, URINE: 1.01
POC UROBILINOGEN, URINE: 0.2 EU/DL
PROT UR STRIP.AUTO-MCNC: NEGATIVE MG/DL
RBC # UR STRIP.AUTO: NEGATIVE /UL
SP GR UR STRIP.AUTO: 1
UROBILINOGEN UR STRIP.AUTO-MCNC: NORMAL MG/DL

## 2024-04-05 PROCEDURE — 1160F RVW MEDS BY RX/DR IN RCRD: CPT | Performed by: UROLOGY

## 2024-04-05 PROCEDURE — 99214 OFFICE O/P EST MOD 30 MIN: CPT | Performed by: UROLOGY

## 2024-04-05 PROCEDURE — 81003 URINALYSIS AUTO W/O SCOPE: CPT | Mod: AHULAB,MUE | Performed by: UROLOGY

## 2024-04-05 PROCEDURE — 51798 US URINE CAPACITY MEASURE: CPT | Performed by: UROLOGY

## 2024-04-05 PROCEDURE — 1159F MED LIST DOCD IN RCRD: CPT | Performed by: UROLOGY

## 2024-04-05 PROCEDURE — 81003 URINALYSIS AUTO W/O SCOPE: CPT | Performed by: UROLOGY

## 2024-04-07 RX ORDER — TADALAFIL 5 MG/1
5 TABLET ORAL DAILY
Qty: 90 TABLET | Refills: 3 | Status: SHIPPED | OUTPATIENT
Start: 2024-04-07 | End: 2024-06-05 | Stop reason: WASHOUT

## 2024-04-15 ENCOUNTER — OFFICE VISIT (OUTPATIENT)
Dept: GASTROENTEROLOGY | Facility: CLINIC | Age: 69
End: 2024-04-15
Payer: MEDICARE

## 2024-04-15 VITALS — HEART RATE: 84 BPM | BODY MASS INDEX: 29.85 KG/M2 | HEIGHT: 71 IN

## 2024-04-15 DIAGNOSIS — K21.9 GASTROESOPHAGEAL REFLUX DISEASE, UNSPECIFIED WHETHER ESOPHAGITIS PRESENT: ICD-10-CM

## 2024-04-15 DIAGNOSIS — R19.4 CHANGE IN BOWEL HABITS: Primary | ICD-10-CM

## 2024-04-15 DIAGNOSIS — K21.9 GASTROESOPHAGEAL REFLUX DISEASE WITHOUT ESOPHAGITIS: ICD-10-CM

## 2024-04-15 DIAGNOSIS — Z86.010 HISTORY OF ADENOMATOUS POLYP OF COLON: ICD-10-CM

## 2024-04-15 PROCEDURE — 1160F RVW MEDS BY RX/DR IN RCRD: CPT | Performed by: INTERNAL MEDICINE

## 2024-04-15 PROCEDURE — 99214 OFFICE O/P EST MOD 30 MIN: CPT | Performed by: INTERNAL MEDICINE

## 2024-04-15 PROCEDURE — 1159F MED LIST DOCD IN RCRD: CPT | Performed by: INTERNAL MEDICINE

## 2024-04-15 RX ORDER — PANTOPRAZOLE SODIUM 40 MG/1
40 TABLET, DELAYED RELEASE ORAL
Qty: 90 TABLET | Refills: 3 | Status: SHIPPED | OUTPATIENT
Start: 2024-04-15

## 2024-04-15 RX ORDER — FAMOTIDINE 20 MG/1
20 TABLET, FILM COATED ORAL NIGHTLY
Qty: 90 TABLET | Refills: 3 | Status: SHIPPED | OUTPATIENT
Start: 2024-04-15

## 2024-04-15 ASSESSMENT — ENCOUNTER SYMPTOMS: SHORTNESS OF BREATH: 0

## 2024-04-15 NOTE — PROGRESS NOTES
REASON FOR VISIT:  GERD  PCP (requesting provider): REANNA Maher-CNP.    HPI:  Anirudh Qiu is a 69 y.o. male with a past medical history of Afib s/p RFA on Eliquis, HTN, HLD, and anxiety following for GERD and personal history of adenomatous colon polyps. GES with rapid gastric emptying (9/2015). GERD well-controlled on Pantoprazole and Famotidine.     The patient reports he is doing ok overall from GI standpoint. He is using Pantoprazole twice daily. He is also on Famotidine. He does miss doses of Famotidine occasionally without recurrent or breakthrough symptoms. He does note that stress can exacerbate his symptoms. He takes Alprazolam as needed for anxiety. He is getting an MRI for his prostate at the end of this week. He does note some sensation of incomplete emptying of stool. He will sometimes have to wipe a lot. His dietary fiber varies. He has taken Citrucel in the past but not taking regularly at this time. No blood in the stool. Denies dysphagia or odynophagia.     PSurgHx:  -Cholecystectomy     FamHx: Paternal aunt and uncle with colon cancer.     Prior Endoscopy:  -Colonoscopy (3/2023): Good prep, normal TI, 2 cm ileocecal valve polyp (TA), ascending and mild sigmoid diverticulosis, medium sized IH/EH, otherwise normal colon.  -EGD (9/2019): Normal esophagus, multiple small gastric polyps (fundic gland polyps), patchy mild gastric erythema (H. pylori -), normal duodenum.  -EGD (9/2017): Normal esophagus, few gastric erosions (H. pylori -), normal duodenum.  -EGD (9/2015): Normal esophagus (normal esophageal biopsies), patchy mild gastric erythema (H. pylori -), few diminutive gastric polyps (fundic gland polyps), normal duodenum.  -Colonoscopy (9/2015): Good prep, descending and sigmoid diverticulosis, otherwise normal colon.  -Colonoscopy (11/2010): 8 mm cecal polyp (TA), otherwise normal colon.     PAST MEDICAL HISTORY  Past Medical History:   Diagnosis Date    Carbuncle of back (any part,  except buttock) 08/20/2019    Carbuncle of back    Chronic pharyngitis 01/30/2018    Sore throat, chronic    Elevated prostate specific antigen (PSA) 12/04/2017    Rising PSA level    Encounter for general adult medical examination without abnormal findings 02/09/2021    Medicare annual wellness visit, initial    Eructation 09/25/2017    Belching    Essential (primary) hypertension 10/19/2022    Essential hypertension    Other specified disease of esophagus 11/30/2017    Esophageal pain    Personal history of diseases of the skin and subcutaneous tissue 08/11/2020    History of actinic keratosis    Personal history of other diseases of male genital organs 08/11/2020    History of epididymitis    Personal history of other diseases of the circulatory system 05/12/2020    History of atrial tachycardia    Personal history of other diseases of the circulatory system     History of hypertension    Personal history of other diseases of the digestive system     History of gastroesophageal reflux (GERD)    Personal history of other diseases of the respiratory system 11/22/2017    History of acute pharyngitis    Personal history of other diseases of the respiratory system 11/30/2017    History of acute bronchitis    Personal history of other diseases of the respiratory system 01/10/2018    History of chronic pharyngitis    Personal history of other infectious and parasitic diseases 03/30/2018    History of herpes zoster    Personal history of other specified conditions 04/26/2018    History of dysuria    Personal history of other specified conditions 11/18/2019    History of epigastric pain    Personal history of other specified conditions 06/09/2021    History of elevated prostate specific antigen (PSA)    Personal history of other specified conditions 09/25/2017    History of tachycardia    Supraventricular tachycardia (CMS-HCC) 02/09/2021    Supraventricular tachycardia    Unspecified abdominal pain 09/24/2015    Stomach  pain       PAST SURGICAL HISTORY  Past Surgical History:   Procedure Laterality Date    BACK SURGERY  01/30/2018    Back Surgery    KNEE ARTHROSCOPY W/ DEBRIDEMENT  10/05/2015    Knee Arthroscopy (Therapeutic)    KNEE SURGERY  01/30/2018    Knee Surgery    OTHER SURGICAL HISTORY  10/05/2015    Arthroscopy Shoulder Right    OTHER SURGICAL HISTORY  10/05/2015    Total Disc Arthroplasty Lumbar    SHOULDER SURGERY  01/30/2018    Shoulder Surgery       FAMILY HISTORY  Family History   Problem Relation Name Age of Onset    Other (seasonal/environmental allergies) Mother      Other (heart problem) Father      Mitral valve prolapse Father      Hypertension Father      Benign prostatic hyperplasia Father      Other (heart problem) Sister      Hypertension Brother      Atrial fibrillation Brother      Colon polyps Brother      Colon cancer Father's Sister      Colon cancer Father's Brother         SOCIAL HISTORY   reports that he has never smoked. He has never used smokeless tobacco. He reports that he does not drink alcohol and does not use drugs.    REVIEW OF SYSTEMS  Review of Systems   Respiratory:  Negative for shortness of breath.    Cardiovascular:  Negative for chest pain.   All other systems reviewed and are negative.    A 10+ point review of systems was otherwise negative except as noted and per HPI.    ALLERGIES  No Known Allergies    MEDICATIONS  Current Outpatient Medications   Medication Instructions    acetaminophen (TYLENOL) 500 mg, oral    ALPRAZolam (XANAX) 0.5 mg, oral, 3 times daily PRN    famotidine (PEPCID) 20 mg, oral, Nightly, DUE TO SEE DOCTOR IN MAY BEFORE NEXT REFILLS    metoprolol succinate XL (TOPROL-XL) 100 mg, oral, Daily    pantoprazole (PROTONIX) 40 mg, oral, 2 times daily with meals    tadalafil (CIALIS) 5 mg, oral, Daily       VITALS  Vitals:    04/15/24 1031   Pulse: 84      Body mass index is 29.85 kg/m².    PHYSICAL EXAM  CONSTITUTIONAL: NAD, appears stated age  EYES: anicteric sclera,  sclera clear  HEAD: normocephalic, atraumatic   NECK: supple   PULMONARY: CTAB  CARDIOVASCULAR: RRR, no M/R/G appreciated   ABDOMEN: soft, NTND, +BS, no rebound or guarding   MUSCULOSKELETAL: no edema  SKIN: no jaundice   PSYCHIATRIC: AOx3, appropriate insight and judgement    LABS  WBC   Date Value   01/15/2024 6.3 x10*3/uL   12/05/2022 6.7 x10E9/L   07/20/2022 9.4 x10E9/L   06/07/2022 8.3 x10E9/L     Hemoglobin (g/dL)   Date Value   01/15/2024 15.4   12/05/2022 14.7   07/20/2022 15.8   06/07/2022 15.5     Platelets   Date Value   01/15/2024 167 x10*3/uL   12/05/2022 170 x10E9/L   07/20/2022 219 x10E9/L   06/07/2022 139 x10E9/L (L)     Sodium (mmol/L)   Date Value   01/15/2024 141   12/05/2022 143   07/20/2022 139   06/07/2022 145     Potassium (mmol/L)   Date Value   01/15/2024 4.3   12/05/2022 4.2   07/20/2022 3.7   06/07/2022 4.1     Chloride (mmol/L)   Date Value   01/15/2024 106   12/05/2022 105   07/20/2022 103   06/07/2022 106     Bicarbonate (mmol/L)   Date Value   01/15/2024 28   12/05/2022 30   07/20/2022 29   06/07/2022 32     Urea Nitrogen (mg/dL)   Date Value   01/15/2024 22   12/05/2022 19   07/20/2022 26 (H)   06/07/2022 28 (H)     Creatinine (mg/dL)   Date Value   01/15/2024 1.17   12/05/2022 1.09   07/20/2022 1.18   06/07/2022 1.07     Calcium (mg/dL)   Date Value   01/15/2024 9.1   12/05/2022 8.6   07/20/2022 9.5   06/07/2022 9.4     Total Protein (g/dL)   Date Value   01/15/2024 6.7   06/07/2022 6.3 (L)   06/10/2021 6.7   05/13/2020 7.7     Bilirubin, Total (mg/dL)   Date Value   01/15/2024 0.5     Total Bilirubin (mg/dL)   Date Value   06/07/2022 0.6   06/10/2021 0.7   05/13/2020 0.9     Alkaline Phosphatase (U/L)   Date Value   01/15/2024 39   06/07/2022 23 (L)   06/10/2021 40   05/13/2020 41     ALT (U/L)   Date Value   01/15/2024 10     ALT (SGPT) (U/L)   Date Value   06/07/2022 25   06/10/2021 13   05/13/2020 18     AST (U/L)   Date Value   01/15/2024 15   06/07/2022 15   06/10/2021 15  "  05/13/2020 20     Glucose (mg/dL)   Date Value   01/15/2024 92   12/05/2022 74   07/20/2022 100 (H)   06/07/2022 85     No results found for: \"LIPASE\", \"CRP\"    ASSESSMENT/PLAN  Anirudh Qiu is a 69 y.o. male with a past medical history of Afib s/p RFA on Eliquis, HTN, HLD, and anxiety following for GERD and personal history of adenomatous colon polyps. GES with rapid gastric emptying (9/2015). GERD well-controlled on Pantoprazole and Famotidine and will try to wean down on the dose. He has been struggling with change in bowel habits with some fecal leakage and sensation of incomplete emptying. We will trial a fiber supplement to bulk the stool.    -Decrease to Pantoprazole 40 mg daily and Famotidine 20 mg at nighttime (if recurrent symptoms then will add back second dose of Pantoprazole)  -Start Metamucil powder once daily   -Due for surveillance colonoscopy in 3/2026    Follow-up in the office in 1 year.    Signature: Jaden Retana MD  "

## 2024-04-19 ENCOUNTER — HOSPITAL ENCOUNTER (OUTPATIENT)
Dept: RADIOLOGY | Facility: CLINIC | Age: 69
Discharge: HOME | End: 2024-04-19
Payer: MEDICARE

## 2024-04-19 DIAGNOSIS — R97.20 ELEVATED PSA: ICD-10-CM

## 2024-04-19 PROCEDURE — A9575 INJ GADOTERATE MEGLUMI 0.1ML: HCPCS | Performed by: UROLOGY

## 2024-04-19 PROCEDURE — 72197 MRI PELVIS W/O & W/DYE: CPT | Performed by: RADIOLOGY

## 2024-04-19 PROCEDURE — 72197 MRI PELVIS W/O & W/DYE: CPT

## 2024-04-19 PROCEDURE — 2550000001 HC RX 255 CONTRASTS: Performed by: UROLOGY

## 2024-04-19 RX ORDER — GADOTERATE MEGLUMINE 376.9 MG/ML
0.2 INJECTION INTRAVENOUS
Status: COMPLETED | OUTPATIENT
Start: 2024-04-19 | End: 2024-04-19

## 2024-04-19 RX ADMIN — GADOTERATE MEGLUMINE 20 ML: 376.9 INJECTION INTRAVENOUS at 13:58

## 2024-04-26 DIAGNOSIS — I10 PRIMARY HYPERTENSION: ICD-10-CM

## 2024-04-29 RX ORDER — METOPROLOL SUCCINATE 100 MG/1
100 TABLET, EXTENDED RELEASE ORAL DAILY
Qty: 90 TABLET | Refills: 3 | Status: SHIPPED | OUTPATIENT
Start: 2024-04-29

## 2024-05-09 NOTE — PROGRESS NOTES
[facility]    Last visit - 4/5/24     HISTORY OF PRESENT ILLNESS:   Anirudh Qiu is a 69 y.o. male who is being seen today for elevated PSA with MRI results  -negative prostate MRI in 04/2018   -fhx of prostate CA (uncle)    PSA trend:  -6.49 on 1/23/24  -5.43 on 1/15/24  3.68 on 6/7/22  -3.42 on 6/10/21  PAST MEDICAL HISTORY:  Past Medical History:   Diagnosis Date    Carbuncle of back (any part, except buttock) 08/20/2019    Carbuncle of back    Chronic pharyngitis 01/30/2018    Sore throat, chronic    Elevated prostate specific antigen (PSA) 12/04/2017    Rising PSA level    Encounter for general adult medical examination without abnormal findings 02/09/2021    Medicare annual wellness visit, initial    Eructation 09/25/2017    Belching    Essential (primary) hypertension 10/19/2022    Essential hypertension    Other specified disease of esophagus 11/30/2017    Esophageal pain    Personal history of diseases of the skin and subcutaneous tissue 08/11/2020    History of actinic keratosis    Personal history of other diseases of male genital organs 08/11/2020    History of epididymitis    Personal history of other diseases of the circulatory system 05/12/2020    History of atrial tachycardia    Personal history of other diseases of the circulatory system     History of hypertension    Personal history of other diseases of the digestive system     History of gastroesophageal reflux (GERD)    Personal history of other diseases of the respiratory system 11/22/2017    History of acute pharyngitis    Personal history of other diseases of the respiratory system 11/30/2017    History of acute bronchitis    Personal history of other diseases of the respiratory system 01/10/2018    History of chronic pharyngitis    Personal history of other infectious and parasitic diseases 03/30/2018    History of herpes zoster    Personal history of other specified conditions 04/26/2018    History of dysuria    Personal history of other  "specified conditions 11/18/2019    History of epigastric pain    Personal history of other specified conditions 06/09/2021    History of elevated prostate specific antigen (PSA)    Personal history of other specified conditions 09/25/2017    History of tachycardia    Supraventricular tachycardia (CMS-HCC) 02/09/2021    Supraventricular tachycardia    Unspecified abdominal pain 09/24/2015    Stomach pain       PAST SURGICAL HISTORY:  Past Surgical History:   Procedure Laterality Date    BACK SURGERY  01/30/2018    Back Surgery    KNEE ARTHROSCOPY W/ DEBRIDEMENT  10/05/2015    Knee Arthroscopy (Therapeutic)    KNEE SURGERY  01/30/2018    Knee Surgery    OTHER SURGICAL HISTORY  10/05/2015    Arthroscopy Shoulder Right    OTHER SURGICAL HISTORY  10/05/2015    Total Disc Arthroplasty Lumbar    SHOULDER SURGERY  01/30/2018    Shoulder Surgery        ALLERGIES:   No Known Allergies     MEDICATIONS:   Current Outpatient Medications   Medication Instructions    acetaminophen (TYLENOL) 500 mg, oral    ALPRAZolam (XANAX) 0.5 mg, oral, 3 times daily PRN    famotidine (PEPCID) 20 mg, oral, Nightly, DUE TO SEE DOCTOR IN MAY BEFORE NEXT REFILLS    metoprolol succinate XL (TOPROL-XL) 100 mg, oral, Daily    pantoprazole (PROTONIX) 40 mg, oral, Daily before breakfast    tadalafil (CIALIS) 5 mg, oral, Daily        PHYSICAL EXAM:  There were no vitals taken for this visit.  Constitutional: Patient appears well-developed and well-nourished. No distress.    Pulmonary/Chest: Effort normal. No respiratory distress.   Abdominal: Soft, ND NT  : WNL  Musculoskeletal: Normal range of motion.    Neurological: Alert and oriented to person, place, and time.  Psychiatric: Normal mood and affect. Behavior is normal. Thought content normal.      Labs:  No results found for: \"TESTOSTERONE\"  Lab Results   Component Value Date    PSA 6.49 (H) 01/23/2024     No components found for: \"CBC\"  Lab Results   Component Value Date    CREATININE 1.17 " "01/15/2024     No components found for: \"TESTOTMS\"  No results found for: \"TESTF\"    PVR: 0  KALIE: 19  AUA: 16  Imaging: A 1.3 cm rounded asymmetric area of decreased ADC value, just right of midline at the right base posterior peripheral zone, is not associated with other signal abnormalities and is potentially an extruded BPH nodule (PI-RADS 3).  BPH changes of the transition zone. Diffuse non nodular hypointensities within the peripheral zone, without evidence of focally restricted diffusion ( PI-RADS 2).    Discussion: prostate MRI results reviewed with patient. Patient reassured. Denies any urinary complaints. Based on his MRI, recommend doing a targeted and standard biopsy for further evaluation. Risks, benefits, alternatives discussed. Denies bloodthinners. Will also switch to Tamsulosin instead of Tadalafil. Pt in agreement with plan. All questions and concerns were addressed. Patient verbalizes understanding and has no other questions at this time.  Assessment:      1. Benign prostatic hyperplasia, unspecified whether lower urinary tract symptoms present  Measure post void residual            Anirudh Qiu is a 69 y.o. male here for FUV     Plan:   1) rx Tamsulosin  2) schedule for targeted and standard biopsy  All questions and concerns were addressed. Patient verbalizes understanding and has no other questions at this time.     Scribe Attestation  By signing my name below, ISenait , Scrib[employer]ucky   attest that this documentation has been prepared under the direction and in the presence of Cody Braga MD.    "

## 2024-05-10 ENCOUNTER — OFFICE VISIT (OUTPATIENT)
Dept: UROLOGY | Facility: HOSPITAL | Age: 69
End: 2024-05-10
Payer: MEDICARE

## 2024-05-10 DIAGNOSIS — N40.0 BENIGN PROSTATIC HYPERPLASIA, UNSPECIFIED WHETHER LOWER URINARY TRACT SYMPTOMS PRESENT: Primary | ICD-10-CM

## 2024-05-10 DIAGNOSIS — R97.20 ELEVATED PSA: ICD-10-CM

## 2024-05-10 PROCEDURE — 1159F MED LIST DOCD IN RCRD: CPT | Performed by: UROLOGY

## 2024-05-10 PROCEDURE — 51798 US URINE CAPACITY MEASURE: CPT | Performed by: UROLOGY

## 2024-05-10 PROCEDURE — 99214 OFFICE O/P EST MOD 30 MIN: CPT | Performed by: UROLOGY

## 2024-05-10 PROCEDURE — 1160F RVW MEDS BY RX/DR IN RCRD: CPT | Performed by: UROLOGY

## 2024-05-10 RX ORDER — TAMSULOSIN HYDROCHLORIDE 0.4 MG/1
0.4 CAPSULE ORAL DAILY
Qty: 30 CAPSULE | Refills: 2 | Status: CANCELLED | OUTPATIENT
Start: 2024-05-10 | End: 2024-08-08

## 2024-05-10 RX ORDER — TAMSULOSIN HYDROCHLORIDE 0.4 MG/1
0.4 CAPSULE ORAL DAILY
Qty: 90 CAPSULE | Refills: 3 | Status: CANCELLED | OUTPATIENT
Start: 2024-05-10 | End: 2025-05-05

## 2024-05-11 RX ORDER — TAMSULOSIN HYDROCHLORIDE 0.4 MG/1
0.4 CAPSULE ORAL DAILY
Qty: 30 CAPSULE | Refills: 2 | Status: SHIPPED | OUTPATIENT
Start: 2024-05-11 | End: 2024-06-07

## 2024-05-23 ENCOUNTER — APPOINTMENT (OUTPATIENT)
Dept: PRIMARY CARE | Facility: CLINIC | Age: 69
End: 2024-05-23
Payer: MEDICARE

## 2024-05-28 NOTE — PROGRESS NOTES
[facility]UV    Last visit - 5/10/24     HISTORY OF PRESENT ILLNESS:   Anirudh Qiu is a 69 y.o. male who is being seen today for prostate biopsy.    PAST MEDICAL HISTORY:  Past Medical History:   Diagnosis Date    Carbuncle of back (any part, except buttock) 08/20/2019    Carbuncle of back    Chronic pharyngitis 01/30/2018    Sore throat, chronic    Elevated prostate specific antigen (PSA) 12/04/2017    Rising PSA level    Encounter for general adult medical examination without abnormal findings 02/09/2021    Medicare annual wellness visit, initial    Eructation 09/25/2017    Belching    Essential (primary) hypertension 10/19/2022    Essential hypertension    Other specified disease of esophagus 11/30/2017    Esophageal pain    Personal history of diseases of the skin and subcutaneous tissue 08/11/2020    History of actinic keratosis    Personal history of other diseases of male genital organs 08/11/2020    History of epididymitis    Personal history of other diseases of the circulatory system 05/12/2020    History of atrial tachycardia    Personal history of other diseases of the circulatory system     History of hypertension    Personal history of other diseases of the digestive system     History of gastroesophageal reflux (GERD)    Personal history of other diseases of the respiratory system 11/22/2017    History of acute pharyngitis    Personal history of other diseases of the respiratory system 11/30/2017    History of acute bronchitis    Personal history of other diseases of the respiratory system 01/10/2018    History of chronic pharyngitis    Personal history of other infectious and parasitic diseases 03/30/2018    History of herpes zoster    Personal history of other specified conditions 04/26/2018    History of dysuria    Personal history of other specified conditions 11/18/2019    History of epigastric pain    Personal history of other specified conditions 06/09/2021    History of elevated prostate specific  "antigen (PSA)    Personal history of other specified conditions 09/25/2017    History of tachycardia    Supraventricular tachycardia (CMS-HCC) 02/09/2021    Supraventricular tachycardia    Unspecified abdominal pain 09/24/2015    Stomach pain   - Negative prostate MRI in 04/2018   - Fhx of prostate CA (uncle)    PAST SURGICAL HISTORY:  Past Surgical History:   Procedure Laterality Date    BACK SURGERY  01/30/2018    Back Surgery    KNEE ARTHROSCOPY W/ DEBRIDEMENT  10/05/2015    Knee Arthroscopy (Therapeutic)    KNEE SURGERY  01/30/2018    Knee Surgery    OTHER SURGICAL HISTORY  10/05/2015    Arthroscopy Shoulder Right    OTHER SURGICAL HISTORY  10/05/2015    Total Disc Arthroplasty Lumbar    SHOULDER SURGERY  01/30/2018    Shoulder Surgery     ALLERGIES:   No Known Allergies     MEDICATIONS:   Current Outpatient Medications   Medication Instructions    acetaminophen (TYLENOL) 500 mg, oral    ALPRAZolam (XANAX) 0.5 mg, oral, 3 times daily PRN    famotidine (PEPCID) 20 mg, oral, Nightly, DUE TO SEE DOCTOR IN MAY BEFORE NEXT REFILLS    metoprolol succinate XL (TOPROL-XL) 100 mg, oral, Daily    pantoprazole (PROTONIX) 40 mg, oral, Daily before breakfast    tadalafil (CIALIS) 5 mg, oral, Daily    tamsulosin (FLOMAX) 0.4 mg, oral, Daily      PHYSICAL EXAM:  There were no vitals taken for this visit.  Constitutional: Patient appears well-developed and well-nourished. No distress.    Pulmonary/Chest: Effort normal. No respiratory distress.   Abdominal: Soft, ND NT  : WNL  Musculoskeletal: Normal range of motion.    Neurological: Alert and oriented to person, place, and time.  Psychiatric: Normal mood and affect. Behavior is normal. Thought content normal.      Labs:  No results found for: \"TESTOSTERONE\"  Lab Results   Component Value Date    PSA 6.49 (H) 01/23/2024     No components found for: \"CBC\"  Lab Results   Component Value Date    CREATININE 1.17 01/15/2024     No components found for: \"TESTOTMS\"  No results found " "for: \"TESTF\"    Imaging:   - MRI of prostate on 4/19/24 demonstrated A 1.3 cm rounded asymmetric area of decreased ADC value, just right of midline at the right base posterior peripheral zone, is not associated with other signal abnormalities and is potentially an extruded BPH nodule (PI-RADS 3). BPH changes of the transition zone. Diffuse non nodular hypointensities within the peripheral zone, without evidence of focally restricted diffusion (PI-RADS 2).  - Results reviewed with patient. Patient reassured.     Discussion:   Doing well, no complaints. Reviewed MRI results and answered all questions. Recommended continuing with biopsy today and patient desires to proceed. Fusion Biopsy with TRUS of 3 ROIs with standard biopsy was completed today without complications and he tolerated the procedure well. Transrectal diagnostic ultrasound guidance for needle biopsy of the prostate was used.   Assessment:      No diagnosis found.  Elevated PSA  Anirudh Qiu is a 69 y.o. male here for FUV     Plan:   Will follow up in 2-3 weeks to discuss biopsy results.   Post-biopsy care instructions were provided to patient.   All questions and concerns were addressed. Patient verbalizes understanding and has no other questions at this time.     Scribe Attestation:  By signing my name below, Charo ORTEGA Scribe   attest that this documentation has been prepared under the direction and in the presence of Cody Braga MD.  "

## 2024-05-30 ENCOUNTER — APPOINTMENT (OUTPATIENT)
Dept: PRIMARY CARE | Facility: CLINIC | Age: 69
End: 2024-05-30
Payer: MEDICARE

## 2024-05-30 ENCOUNTER — PROCEDURE VISIT (OUTPATIENT)
Dept: UROLOGY | Facility: HOSPITAL | Age: 69
End: 2024-05-30
Payer: MEDICARE

## 2024-05-30 DIAGNOSIS — R97.20 ELEVATED PSA: Primary | ICD-10-CM

## 2024-05-30 PROCEDURE — 76872 US TRANSRECTAL: CPT | Performed by: UROLOGY

## 2024-05-30 PROCEDURE — 76942 ECHO GUIDE FOR BIOPSY: CPT | Performed by: UROLOGY

## 2024-05-30 PROCEDURE — 55700 PR PROSTATE NEEDLE BIOPSY ANY APPROACH: CPT | Performed by: UROLOGY

## 2024-05-30 PROCEDURE — 55700 HC BIOPSY OF PROSTATE,NEEDLE/PUNCH: CPT | Performed by: UROLOGY

## 2024-06-05 ENCOUNTER — OFFICE VISIT (OUTPATIENT)
Dept: PRIMARY CARE | Facility: CLINIC | Age: 69
End: 2024-06-05
Payer: MEDICARE

## 2024-06-05 VITALS
TEMPERATURE: 98 F | DIASTOLIC BLOOD PRESSURE: 80 MMHG | HEIGHT: 72 IN | SYSTOLIC BLOOD PRESSURE: 140 MMHG | BODY MASS INDEX: 29.02 KG/M2

## 2024-06-05 DIAGNOSIS — F41.8 SITUATIONAL ANXIETY: ICD-10-CM

## 2024-06-05 DIAGNOSIS — T88.7XXA SIDE EFFECT OF MEDICATION: ICD-10-CM

## 2024-06-05 DIAGNOSIS — G89.29 OTHER CHRONIC BACK PAIN: ICD-10-CM

## 2024-06-05 DIAGNOSIS — M54.9 OTHER CHRONIC BACK PAIN: ICD-10-CM

## 2024-06-05 PROBLEM — R19.8 ALTERED BOWEL FUNCTION: Status: ACTIVE | Noted: 2024-06-05

## 2024-06-05 PROBLEM — Z86.010 HISTORY OF ADENOMATOUS POLYP OF COLON: Status: ACTIVE | Noted: 2024-06-05

## 2024-06-05 PROBLEM — Z86.0101 HISTORY OF ADENOMATOUS POLYP OF COLON: Status: ACTIVE | Noted: 2024-06-05

## 2024-06-05 PROBLEM — N40.0 BENIGN PROSTATIC HYPERPLASIA: Status: ACTIVE | Noted: 2024-06-05

## 2024-06-05 PROBLEM — J34.2 DEVIATED NASAL SEPTUM: Status: ACTIVE | Noted: 2023-09-22

## 2024-06-05 PROCEDURE — 3077F SYST BP >= 140 MM HG: CPT | Performed by: NURSE PRACTITIONER

## 2024-06-05 PROCEDURE — 3079F DIAST BP 80-89 MM HG: CPT | Performed by: NURSE PRACTITIONER

## 2024-06-05 PROCEDURE — 1159F MED LIST DOCD IN RCRD: CPT | Performed by: NURSE PRACTITIONER

## 2024-06-05 PROCEDURE — 1160F RVW MEDS BY RX/DR IN RCRD: CPT | Performed by: NURSE PRACTITIONER

## 2024-06-05 PROCEDURE — 99214 OFFICE O/P EST MOD 30 MIN: CPT | Performed by: NURSE PRACTITIONER

## 2024-06-05 RX ORDER — ALPRAZOLAM 0.5 MG/1
0.5 TABLET ORAL 3 TIMES DAILY PRN
Qty: 21 TABLET | Refills: 0 | Status: SHIPPED | OUTPATIENT
Start: 2024-06-05

## 2024-06-05 NOTE — PATIENT INSTRUCTIONS
Good to see you today.  RX refilled.  Stay as active as you can.  Plan to do Medicare Wellness the beginning of October.

## 2024-06-05 NOTE — PROGRESS NOTES
Subjective   Patient ID: Anirudh Qiu is a 69 y.o. male who presents for Follow-up (Back pain and urology visit).    [facility]   Urology Saw Dr. Braga.  On rx for improving urination.  Notices that he has some sensation in mouth/throat when swallowing.  Burning feeling.  Pain Management:    OAR[facility]:  Chika Cordova, REANNA-CNP on 6/5/2024  1:21 PM  I have personally reviewed the OARRS report for Anirudh Qiu. I have considered the risks of abuse, dependence, addiction and diversion and I believe that it is clinically appropriate for Anirudh Qiu to be prescribed this medication    Is the patient prescribed a combination of a benzodiazepine and opioid?  No    Last Urine Drug Screen / ordered today: No  No results found for this or any previous visit (from the past 8760 hour(s)).  N/A    Clinical rationale for not completing a Urine Drug Screen:    Pt takes limited amount PRN before procedures.      Controlled Substance Agreement:  Date of the Last Agreement: today  Reviewed Controlled Substance Agreement including but not limited to the benefits, risks, and alternatives to treatment with a Controlled Substance medication(s).    Benzodiazepines:  What is the patient's goal of therapy? Takes before procedures or appointments.  To avoid panic attacks.  Is this being achieved with current treatment? Yes    Activities of Daily Living:   Is your overall impression that this patient is benefiting (symptom reduction outweighs side effects) from benzodiazepine therapy? Yes     1. Physical Functioning: Better  2. Family Relationship: Same  3. Social Relationship: Same  4. Mood: Same  5. Sleep Patterns: Same  6. Overall Function: Better  Review of Systems   Constitutional:  Positive for activity change.   All other systems reviewed and are negative.        Objective   /80   Temp 36.7 °C (98 °F)   Ht 1.829 m (6')   BMI 29.02 kg/m²     Physical Exam  Vitals and nursing note reviewed.   Constitutional:       Appearance:  He is obese.   HENT:      Head: Normocephalic and atraumatic.      Right Ear: Tympanic membrane, ear canal and external ear normal.      Left Ear: Tympanic membrane, ear canal and external ear normal.      Mouth/Throat:      Pharynx: Oropharynx is clear.      Comments: Mucous membranes moist  Neck:      Thyroid: No thyroid mass, thyromegaly or thyroid tenderness.   Cardiovascular:      Rate and Rhythm: Normal rate and regular rhythm.      Pulses: Normal pulses.      Heart sounds: Normal heart sounds.   Pulmonary:      Effort: Pulmonary effort is normal.      Breath sounds: Normal breath sounds.   Abdominal:      General: Bowel sounds are normal.      Palpations: Abdomen is soft.   Musculoskeletal:         General: Tenderness and deformity present.      Comments: C/O thoracolumbar paraspinal mm tenderness.   Neurological:      Mental Status: He is alert and oriented to person, place, and time.   Psychiatric:         Mood and Affect: Mood normal.         Behavior: Behavior normal.         Assessment/Plan   Problem List Items Addressed This Visit             ICD-10-CM    Chronic back pain M54.9, G89.29    Side effect of medication T88.7XXA    Situational anxiety - Primary F41.8    Relevant Medications    ALPRAZolam (Xanax) 0.5 mg tablet

## 2024-06-06 DIAGNOSIS — R97.20 ELEVATED PSA: ICD-10-CM

## 2024-06-07 RX ORDER — TAMSULOSIN HYDROCHLORIDE 0.4 MG/1
0.4 CAPSULE ORAL DAILY
Qty: 90 CAPSULE | Refills: 1 | Status: SHIPPED | OUTPATIENT
Start: 2024-06-07

## 2024-06-13 NOTE — PROGRESS NOTES
FUV    Last visit - 5/30/24     HISTORY OF PRESENT ILLNESS:   Anirudh Qiu is a 69 y.o. male who is being seen today for prostate biopsy results    PAST MEDICAL HISTORY:  Past Medical History:   Diagnosis Date    Carbuncle of back (any part, except buttock) 08/20/2019    Carbuncle of back    Chronic pharyngitis 01/30/2018    Sore throat, chronic    Elevated prostate specific antigen (PSA) 12/04/2017    Rising PSA level    Encounter for general adult medical examination without abnormal findings 02/09/2021    Medicare annual wellness visit, initial    Eructation 09/25/2017    Belching    Essential (primary) hypertension 10/19/2022    Essential hypertension    Other specified disease of esophagus 11/30/2017    Esophageal pain    Personal history of diseases of the skin and subcutaneous tissue 08/11/2020    History of actinic keratosis    Personal history of other diseases of male genital organs 08/11/2020    History of epididymitis    Personal history of other diseases of the circulatory system 05/12/2020    History of atrial tachycardia    Personal history of other diseases of the circulatory system     History of hypertension    Personal history of other diseases of the digestive system     History of gastroesophageal reflux (GERD)    Personal history of other diseases of the respiratory system 11/22/2017    History of acute pharyngitis    Personal history of other diseases of the respiratory system 11/30/2017    History of acute bronchitis    Personal history of other diseases of the respiratory system 01/10/2018    History of chronic pharyngitis    Personal history of other infectious and parasitic diseases 03/30/2018    History of herpes zoster    Personal history of other specified conditions 04/26/2018    History of dysuria    Personal history of other specified conditions 11/18/2019    History of epigastric pain    Personal history of other specified conditions 06/09/2021    History of elevated prostate  "specific antigen (PSA)    Personal history of other specified conditions 09/25/2017    History of tachycardia    Supraventricular tachycardia (CMS-HCC) 02/09/2021    Supraventricular tachycardia    Unspecified abdominal pain 09/24/2015    Stomach pain   - Negative prostate MRI in 04/2018   - Fhx of prostate CA (uncle)    PAST SURGICAL HISTORY:  Past Surgical History:   Procedure Laterality Date    BACK SURGERY  01/30/2018    Back Surgery    KNEE ARTHROSCOPY W/ DEBRIDEMENT  10/05/2015    Knee Arthroscopy (Therapeutic)    KNEE SURGERY  01/30/2018    Knee Surgery    OTHER SURGICAL HISTORY  10/05/2015    Arthroscopy Shoulder Right    OTHER SURGICAL HISTORY  10/05/2015    Total Disc Arthroplasty Lumbar    SHOULDER SURGERY  01/30/2018    Shoulder Surgery     ALLERGIES:   No Known Allergies     MEDICATIONS:   Current Outpatient Medications   Medication Instructions    acetaminophen (TYLENOL) 500 mg, oral    ALPRAZolam (XANAX) 0.5 mg, oral, 3 times daily PRN    famotidine (PEPCID) 20 mg, oral, Nightly, DUE TO SEE DOCTOR IN MAY BEFORE NEXT REFILLS    metoprolol succinate XL (TOPROL-XL) 100 mg, oral, Daily    pantoprazole (PROTONIX) 40 mg, oral, Daily before breakfast    tamsulosin (FLOMAX) 0.4 mg, oral, Daily      PHYSICAL EXAM:  There were no vitals taken for this visit.  Constitutional: Patient appears well-developed and well-nourished. No distress.    Pulmonary/Chest: Effort normal. No respiratory distress.   Abdominal: Soft, ND NT  : WNL  Musculoskeletal: Normal range of motion.    Neurological: Alert and oriented to person, place, and time.  Psychiatric: Normal mood and affect. Behavior is normal. Thought content normal.      Labs:  No results found for: \"TESTOSTERONE\"  Lab Results   Component Value Date    PSA 6.49 (H) 01/23/2024     No components found for: \"CBC\"  Lab Results   Component Value Date    CREATININE 1.17 01/15/2024     No components found for: \"TESTOTMS\"  No results found for: \"TESTF\"    Imaging:   - " MRI of prostate on 4/19/24 demonstrated A 1.3 cm rounded asymmetric area of decreased ADC value, just right of midline at the right base posterior peripheral zone, is not associated with other signal abnormalities and is potentially an extruded BPH nodule (PI-RADS 3). BPH changes of the transition zone. Diffuse non nodular hypointensities within the peripheral zone, without evidence of focally restricted diffusion (PI-RADS 2).    Surgical pathology 5/30/24:     Discussion:     Assessment:      No diagnosis found.  Elevated PSA  Anirudh Qiu is a 69 y.o. male here for FUV     Plan:   Will follow up in 2-3 weeks to discuss biopsy results.   Post-biopsy care instructions were provided to patient.   All questions and concerns were addressed. Patient verbalizes understanding and has no other questions at this time.     Scribe Attestation:  By signing my name below, SHANNON CharoWilberto Herreraibbucky   attest that this documentation has been prepared under the direction and in the presence of Cody Braga MD.

## 2024-06-14 ENCOUNTER — APPOINTMENT (OUTPATIENT)
Dept: UROLOGY | Facility: HOSPITAL | Age: 69
End: 2024-06-14
Payer: MEDICARE

## 2024-06-18 LAB
LAB AP ASR DISCLAIMER: NORMAL
LABORATORY COMMENT REPORT: NORMAL
PATH REPORT.FINAL DX SPEC: NORMAL
PATH REPORT.GROSS SPEC: NORMAL
PATH REPORT.RELEVANT HX SPEC: NORMAL
PATH REPORT.TOTAL CANCER: NORMAL

## 2024-06-19 NOTE — PROGRESS NOTES
FUV    Last visit - 5/30/24     HISTORY OF PRESENT ILLNESS:   Anirudh Qiu is a 69 y.o. male who is being seen today for surgical pathology results.    PAST MEDICAL HISTORY:  Past Medical History:   Diagnosis Date    Carbuncle of back (any part, except buttock) 08/20/2019    Carbuncle of back    Chronic pharyngitis 01/30/2018    Sore throat, chronic    Elevated prostate specific antigen (PSA) 12/04/2017    Rising PSA level    Encounter for general adult medical examination without abnormal findings 02/09/2021    Medicare annual wellness visit, initial    Eructation 09/25/2017    Belching    Essential (primary) hypertension 10/19/2022    Essential hypertension    Other specified disease of esophagus 11/30/2017    Esophageal pain    Personal history of diseases of the skin and subcutaneous tissue 08/11/2020    History of actinic keratosis    Personal history of other diseases of male genital organs 08/11/2020    History of epididymitis    Personal history of other diseases of the circulatory system 05/12/2020    History of atrial tachycardia    Personal history of other diseases of the circulatory system     History of hypertension    Personal history of other diseases of the digestive system     History of gastroesophageal reflux (GERD)    Personal history of other diseases of the respiratory system 11/22/2017    History of acute pharyngitis    Personal history of other diseases of the respiratory system 11/30/2017    History of acute bronchitis    Personal history of other diseases of the respiratory system 01/10/2018    History of chronic pharyngitis    Personal history of other infectious and parasitic diseases 03/30/2018    History of herpes zoster    Personal history of other specified conditions 04/26/2018    History of dysuria    Personal history of other specified conditions 11/18/2019    History of epigastric pain    Personal history of other specified conditions 06/09/2021    History of elevated prostate  "specific antigen (PSA)    Personal history of other specified conditions 09/25/2017    History of tachycardia    Supraventricular tachycardia (CMS-HCC) 02/09/2021    Supraventricular tachycardia    Unspecified abdominal pain 09/24/2015    Stomach pain   - Negative prostate MRI in 04/2018   - Fhx of prostate CA (uncle)    PAST SURGICAL HISTORY:  Past Surgical History:   Procedure Laterality Date    BACK SURGERY  01/30/2018    Back Surgery    KNEE ARTHROSCOPY W/ DEBRIDEMENT  10/05/2015    Knee Arthroscopy (Therapeutic)    KNEE SURGERY  01/30/2018    Knee Surgery    OTHER SURGICAL HISTORY  10/05/2015    Arthroscopy Shoulder Right    OTHER SURGICAL HISTORY  10/05/2015    Total Disc Arthroplasty Lumbar    SHOULDER SURGERY  01/30/2018    Shoulder Surgery     ALLERGIES:   No Known Allergies     MEDICATIONS:   Current Outpatient Medications   Medication Instructions    acetaminophen (TYLENOL) 500 mg, oral    ALPRAZolam (XANAX) 0.5 mg, oral, 3 times daily PRN    famotidine (PEPCID) 20 mg, oral, Nightly, DUE TO SEE DOCTOR IN MAY BEFORE NEXT REFILLS    metoprolol succinate XL (TOPROL-XL) 100 mg, oral, Daily    pantoprazole (PROTONIX) 40 mg, oral, Daily before breakfast    tamsulosin (FLOMAX) 0.4 mg, oral, Daily      PHYSICAL EXAM:  There were no vitals taken for this visit.  Constitutional: Patient appears well-developed and well-nourished. No distress.    Pulmonary/Chest: Effort normal. No respiratory distress.   Abdominal: Soft, ND NT  : WNL  Musculoskeletal: Normal range of motion.    Neurological: Alert and oriented to person, place, and time.  Psychiatric: Normal mood and affect. Behavior is normal. Thought content normal.      Labs:  No results found for: \"TESTOSTERONE\"  Lab Results   Component Value Date    PSA 6.49 (H) 01/23/2024     No components found for: \"CBC\"  Lab Results   Component Value Date    CREATININE 1.17 01/15/2024     No components found for: \"TESTOTMS\"  No results found for: \"TESTF\"    Imaging:   - " MRI of prostate on 4/19/24 demonstrated A 1.3 cm rounded asymmetric area of decreased ADC value, just right of midline at the right base posterior peripheral zone, is not associated with other signal abnormalities and is potentially an extruded BPH nodule (PI-RADS 3). BPH changes of the transition zone. Diffuse non nodular hypointensities within the peripheral zone, without evidence of focally restricted diffusion (PI-RADS 2).  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   - Results reviewed with patient. Patient reassured.     Discussion:  Doing well, no complaints. Denies any dysuria, hematuria, bothersome urinary frequency, urgency, or flank pain. Patient denies any pushing or straining to urinate. Reviewed pathology results with patient, pt reassured. Discussed possible reasons for elevated PSA and explained process for evaluation. Reports he stopped taking Tamsulosin due to a numbness/tingling/burning sensation to lips but he is still experiencing this despite discontinuing. He would like to refill Tamsulosin at this time. Will continue to monitor PSA.  KALIE 17  AUA 12  Assessment:      No diagnosis found.  Elevated PSA  Anirudh Qiu is a 69 y.o. male here for FUV     Plan:   Follow up in 6 months with PSA prior.   Send refill for Tamsulosin to patients pharmacy.  All questions and concerns were addressed. Patient verbalizes understanding and has no other questions at this time.     Scribe Attestation:  By signing my name below, Charo ORTEGA Scribe   attest that this documentation has been prepared under the direction and in the presence of Cody Braga MD.

## 2024-06-20 ENCOUNTER — OFFICE VISIT (OUTPATIENT)
Dept: UROLOGY | Facility: HOSPITAL | Age: 69
End: 2024-06-20
Payer: MEDICARE

## 2024-06-20 DIAGNOSIS — R97.20 ELEVATED PSA: ICD-10-CM

## 2024-06-20 PROCEDURE — 99213 OFFICE O/P EST LOW 20 MIN: CPT | Performed by: UROLOGY

## 2024-06-20 PROCEDURE — 1036F TOBACCO NON-USER: CPT | Performed by: UROLOGY

## 2024-06-20 RX ORDER — TAMSULOSIN HYDROCHLORIDE 0.4 MG/1
0.4 CAPSULE ORAL DAILY
Qty: 90 CAPSULE | Refills: 3 | Status: SHIPPED | OUTPATIENT
Start: 2024-06-20

## 2024-06-21 ENCOUNTER — APPOINTMENT (OUTPATIENT)
Dept: UROLOGY | Facility: HOSPITAL | Age: 69
End: 2024-06-21
Payer: MEDICARE

## 2024-06-27 ENCOUNTER — OFFICE VISIT (OUTPATIENT)
Dept: CARDIOLOGY | Facility: CLINIC | Age: 69
End: 2024-06-27
Payer: MEDICARE

## 2024-06-27 VITALS
HEART RATE: 74 BPM | BODY MASS INDEX: 36.57 KG/M2 | HEIGHT: 72 IN | WEIGHT: 270 LBS | DIASTOLIC BLOOD PRESSURE: 72 MMHG | SYSTOLIC BLOOD PRESSURE: 130 MMHG

## 2024-06-27 DIAGNOSIS — I10 BENIGN ESSENTIAL HYPERTENSION: ICD-10-CM

## 2024-06-27 DIAGNOSIS — I48.0 PAROXYSMAL ATRIAL FIBRILLATION (MULTI): Primary | ICD-10-CM

## 2024-06-27 DIAGNOSIS — E78.2 MIXED HYPERLIPIDEMIA: ICD-10-CM

## 2024-06-27 PROCEDURE — 3078F DIAST BP <80 MM HG: CPT | Performed by: INTERNAL MEDICINE

## 2024-06-27 PROCEDURE — 99213 OFFICE O/P EST LOW 20 MIN: CPT | Performed by: INTERNAL MEDICINE

## 2024-06-27 PROCEDURE — 3077F SYST BP >= 140 MM HG: CPT | Performed by: INTERNAL MEDICINE

## 2024-06-27 PROCEDURE — 1160F RVW MEDS BY RX/DR IN RCRD: CPT | Performed by: INTERNAL MEDICINE

## 2024-06-27 PROCEDURE — 1036F TOBACCO NON-USER: CPT | Performed by: INTERNAL MEDICINE

## 2024-06-27 PROCEDURE — 3075F SYST BP GE 130 - 139MM HG: CPT | Performed by: INTERNAL MEDICINE

## 2024-06-27 PROCEDURE — 1159F MED LIST DOCD IN RCRD: CPT | Performed by: INTERNAL MEDICINE

## 2024-06-27 PROCEDURE — 3079F DIAST BP 80-89 MM HG: CPT | Performed by: INTERNAL MEDICINE

## 2024-06-27 NOTE — PROGRESS NOTES
Chief Complaint:   Atrial Fibrillation     History of Present Illness     Anirudh Qiu is a 69 y.o. male presenting with for routine follow-up of paroxysmal atrial fibrillation s/p PVI 12/14/22.  The patient initially presented with symptoms of palpitations.  The patient has been maintaining sinus rhythm on medical treatment which they are tolerating well and are compliant.  The current treatment strategy is rhythm control.  The CHADS2-VASC2 score is 2  and the ACC/AHA guidelines recommend anticoagulation for stroke prophylaxis.  The patient is being treated with no OAC per EP s/p PVI.     Review of Systems  All pertinent systems have been reviewed and are negative except for what is stated in the history of present illness.    All other systems have been reviewed and are negative and noncontributory to this patient's current ailments.  .       Previous History     Past Medical History:  He has a past medical history of Carbuncle of back (any part, except buttock) (08/20/2019), Chronic pharyngitis (01/30/2018), Elevated prostate specific antigen (PSA) (12/04/2017), Encounter for general adult medical examination without abnormal findings (02/09/2021), Eructation (09/25/2017), Essential (primary) hypertension (10/19/2022), Other specified disease of esophagus (11/30/2017), Personal history of diseases of the skin and subcutaneous tissue (08/11/2020), Personal history of other diseases of male genital organs (08/11/2020), Personal history of other diseases of the circulatory system (05/12/2020), Personal history of other diseases of the circulatory system, Personal history of other diseases of the digestive system, Personal history of other diseases of the respiratory system (11/22/2017), Personal history of other diseases of the respiratory system (11/30/2017), Personal history of other diseases of the respiratory system (01/10/2018), Personal history of other infectious and parasitic diseases (03/30/2018),  Personal history of other specified conditions (04/26/2018), Personal history of other specified conditions (11/18/2019), Personal history of other specified conditions (06/09/2021), Personal history of other specified conditions (09/25/2017), Supraventricular tachycardia (CMS-HCC) (02/09/2021), and Unspecified abdominal pain (09/24/2015).    Past Surgical History:  He has a past surgical history that includes Other surgical history (10/05/2015); Other surgical history (10/05/2015); Knee arthroscopy w/ debridement (10/05/2015); Back surgery (01/30/2018); Shoulder surgery (01/30/2018); and Knee surgery (01/30/2018).      Social History:  He reports that he has never smoked. He has never been exposed to tobacco smoke. He has never used smokeless tobacco. He reports current alcohol use. He reports that he does not use drugs.    Family History:  Family History   Problem Relation Name Age of Onset    Other (seasonal/environmental allergies) Mother      Other (heart problem) Father      Mitral valve prolapse Father      Hypertension Father      Benign prostatic hyperplasia Father      Other (heart problem) Sister      Hypertension Brother      Atrial fibrillation Brother      Colon polyps Brother      Colon cancer Father's Sister      Colon cancer Father's Brother      Prostate cancer Other MAT Cousin         Allergies:  Patient has no known allergies.    Outpatient Medications:  Current Outpatient Medications   Medication Instructions    acetaminophen (TYLENOL) 500 mg, oral    ALPRAZolam (XANAX) 0.5 mg, oral, 3 times daily PRN    famotidine (PEPCID) 20 mg, oral, Nightly, DUE TO SEE DOCTOR IN MAY BEFORE NEXT REFILLS    metoprolol succinate XL (TOPROL-XL) 100 mg, oral, Daily    pantoprazole (PROTONIX) 40 mg, oral, Daily before breakfast    tamsulosin (FLOMAX) 0.4 mg, oral, Daily       Physical Examination   Vitals:  Visit Vitals  /86   Pulse 74   Ht 1.829 m (6')   Wt 122 kg (270 lb) Comment: stated, refused scale    BMI 36.62 kg/m²   Smoking Status Never   BSA 2.49 m²    Physical Exam  Vitals reviewed.   Constitutional:       General: He is not in acute distress.     Appearance: Normal appearance.   HENT:      Head: Normocephalic and atraumatic.      Nose: Nose normal.   Eyes:      Conjunctiva/sclera: Conjunctivae normal.   Cardiovascular:      Rate and Rhythm: Normal rate and regular rhythm.      Pulses: Normal pulses.      Heart sounds: No murmur heard.  Pulmonary:      Effort: Pulmonary effort is normal. No respiratory distress.      Breath sounds: Normal breath sounds. No wheezing, rhonchi or rales.   Abdominal:      General: Bowel sounds are normal. There is no distension.      Palpations: Abdomen is soft.      Tenderness: There is no abdominal tenderness.   Musculoskeletal:         General: No swelling.      Right lower leg: No edema.      Left lower leg: No edema.   Skin:     General: Skin is warm and dry.      Capillary Refill: Capillary refill takes less than 2 seconds.   Neurological:      General: No focal deficit present.      Mental Status: He is alert.   Psychiatric:         Mood and Affect: Mood normal.             Labs/Imaging/Cardiac Studies     Last Labs:  CBC -  Lab Results   Component Value Date    WBC 6.3 01/15/2024    HGB 15.4 01/15/2024    HCT 47.5 01/15/2024    MCV 90 01/15/2024     01/15/2024       CMP -  Lab Results   Component Value Date    CALCIUM 9.1 01/15/2024    PHOS 3.7 09/26/2019    PROT 6.7 01/15/2024    ALBUMIN 4.2 01/15/2024    AST 15 01/15/2024    ALT 10 01/15/2024    ALKPHOS 39 01/15/2024    BILITOT 0.5 01/15/2024       LIPID PANEL -   Lab Results   Component Value Date    CHOL 153 01/15/2024    HDL 36.7 01/15/2024    CHHDL 4.2 01/15/2024    VLDL 17 01/15/2024    TRIG 84 01/15/2024    NHDL 116 01/15/2024       RENAL FUNCTION PANEL -   Lab Results   Component Value Date    K 4.3 01/15/2024    PHOS 3.7 09/26/2019       Lab Results   Component Value Date    HGBA1C 5.4 01/15/2024        ECG:    Echo:  No echocardiogram results found for the past 12 months       Assessment and Recommendations     Assessment/Plan       1. Paroxysmal atrial fibrillation (Multi)  The patient has been clinically stable, asymptomatic, and maintaining normal sinus rhythm.  They will continue treatment with rate-controlling medications and anticoagulation for stroke prophylaxis based upon their present [id number] score, the risks and benefits of which were discussed with the patient/family/caregiver.   - Follow Up In Cardiology; Future    2. Mixed hyperlipidemia  The patient's lipids are well controlled on chronic statin therapy and they are meeting their goal LDL cholesterol per the ACC/AHA guidelines.      - Follow Up In Cardiology; Future    3. Benign essential hypertension  The patient's blood pressure has been well-controlled at today's appointment or by recent primary care provider's measurements/home measurements and meets their goal blood pressure per the ACC/AHA guidelines.  The patient has been compliant with their anti-hypertensive medications and is following a low sodium/DASH diet. I advised continuation of their present medical treatment and lifestyle modification.      - Follow Up In Cardiology; Future     Anirudh Qiu will return in 1 year for an office visit.       Fercho Sprague MD    Exclusive of any other services or procedures performed, I, Fercho Sprague MD , spent 30 minutes in duration for this visit today.  This time consisted of chart review, obtaining history, and/or performing the exam as documented above as well as documenting the clinical information for the encounter in the electronic record, discussing treatment options, plans, and/or goals with patient, family, and/or caregiver, refilling medications, updating the electronic record, ordering medicines, lab work, imaging, referrals, and/or procedures as documented above and communicating with other [facility] professionals. I have  discussed the results of laboratory, radiology, and cardiology studies with the patient and their family/caregiver.

## 2024-07-05 PROBLEM — M79.605 LEFT LEG PAIN: Status: RESOLVED | Noted: 2023-09-22 | Resolved: 2024-07-05

## 2024-07-05 PROBLEM — I48.20 CHRONIC ATRIAL FIBRILLATION (MULTI): Status: ACTIVE | Noted: 2022-06-07

## 2024-07-05 PROBLEM — M79.89 LEFT LEG SWELLING: Status: RESOLVED | Noted: 2023-10-01 | Resolved: 2024-07-05

## 2024-07-05 PROBLEM — K80.20 CALCULUS OF GALLBLADDER WITHOUT CHOLECYSTITIS WITHOUT OBSTRUCTION: Status: ACTIVE | Noted: 2023-03-17

## 2024-07-05 PROBLEM — L20.9 ATOPIC DERMATITIS OF SCALP: Status: RESOLVED | Noted: 2023-09-22 | Resolved: 2024-07-05

## 2024-07-05 PROBLEM — T88.7XXA SIDE EFFECT OF MEDICATION: Status: ACTIVE | Noted: 2024-07-05

## 2024-07-05 ASSESSMENT — ENCOUNTER SYMPTOMS: ACTIVITY CHANGE: 1

## 2024-07-30 ENCOUNTER — TELEPHONE (OUTPATIENT)
Dept: PRIMARY CARE | Facility: CLINIC | Age: 69
End: 2024-07-30
Payer: MEDICARE

## 2024-07-30 NOTE — TELEPHONE ENCOUNTER
----- Message from Chika Cordova sent at 7/30/2024  7:58 AM EDT -----  Regarding: Knee pain  Please CALL pt    He should have the  ORTHO Urgent Care info and let him know there are some ORTHO URGENT Cares for [facility] too.  This would be the most appropriate care at this time.  
Spoke to pt, gave him a few options for walk in ortho clinics.  
- - -

## 2024-10-01 ENCOUNTER — APPOINTMENT (OUTPATIENT)
Dept: PRIMARY CARE | Facility: CLINIC | Age: 69
End: 2024-10-01
Payer: MEDICARE

## 2024-10-01 VITALS
WEIGHT: 290 LBS | TEMPERATURE: 97.7 F | HEIGHT: 72 IN | BODY MASS INDEX: 39.28 KG/M2 | DIASTOLIC BLOOD PRESSURE: 84 MMHG | SYSTOLIC BLOOD PRESSURE: 140 MMHG

## 2024-10-01 DIAGNOSIS — Z00.00 ROUTINE GENERAL MEDICAL EXAMINATION AT A HEALTH CARE FACILITY: Primary | ICD-10-CM

## 2024-10-01 DIAGNOSIS — Z23 NEED FOR IMMUNIZATION AGAINST INFLUENZA: ICD-10-CM

## 2024-10-01 DIAGNOSIS — R26.2 DIFFICULTY WALKING: ICD-10-CM

## 2024-10-01 PROCEDURE — G0008 ADMIN INFLUENZA VIRUS VAC: HCPCS | Performed by: NURSE PRACTITIONER

## 2024-10-01 PROCEDURE — G0439 PPPS, SUBSEQ VISIT: HCPCS | Performed by: NURSE PRACTITIONER

## 2024-10-01 PROCEDURE — 1159F MED LIST DOCD IN RCRD: CPT | Performed by: NURSE PRACTITIONER

## 2024-10-01 PROCEDURE — 3079F DIAST BP 80-89 MM HG: CPT | Performed by: NURSE PRACTITIONER

## 2024-10-01 PROCEDURE — 1160F RVW MEDS BY RX/DR IN RCRD: CPT | Performed by: NURSE PRACTITIONER

## 2024-10-01 PROCEDURE — 90662 IIV NO PRSV INCREASED AG IM: CPT | Performed by: NURSE PRACTITIONER

## 2024-10-01 PROCEDURE — 1158F ADVNC CARE PLAN TLK DOCD: CPT | Performed by: NURSE PRACTITIONER

## 2024-10-01 PROCEDURE — 1170F FXNL STATUS ASSESSED: CPT | Performed by: NURSE PRACTITIONER

## 2024-10-01 PROCEDURE — 3008F BODY MASS INDEX DOCD: CPT | Performed by: NURSE PRACTITIONER

## 2024-10-01 PROCEDURE — 1123F ACP DISCUSS/DSCN MKR DOCD: CPT | Performed by: NURSE PRACTITIONER

## 2024-10-01 PROCEDURE — 3077F SYST BP >= 140 MM HG: CPT | Performed by: NURSE PRACTITIONER

## 2024-10-01 RX ORDER — MELOXICAM 15 MG/1
15 TABLET ORAL DAILY
COMMUNITY

## 2024-10-01 ASSESSMENT — ACTIVITIES OF DAILY LIVING (ADL)
MANAGING_FINANCES: NEEDS ASSISTANCE
DRESSING: INDEPENDENT
DOING_HOUSEWORK: INDEPENDENT
GROCERY_SHOPPING: NEEDS ASSISTANCE
BATHING: INDEPENDENT
TAKING_MEDICATION: INDEPENDENT

## 2024-10-01 ASSESSMENT — PATIENT HEALTH QUESTIONNAIRE - PHQ9
2. FEELING DOWN, DEPRESSED OR HOPELESS: NOT AT ALL
2. FEELING DOWN, DEPRESSED OR HOPELESS: SEVERAL DAYS
SUM OF ALL RESPONSES TO PHQ9 QUESTIONS 1 AND 2: 0
1. LITTLE INTEREST OR PLEASURE IN DOING THINGS: NOT AT ALL

## 2024-10-01 NOTE — PROGRESS NOTES
Subjective   Patient ID: Anirudh Qiu is a 69 y.o. male who presents for Annual Medicare Wellness Visit.    HPI   Here for  Wellness today  Doing PT for a while.  [facility]  To follow up on Thursday.  Limits walking because of pain.  Not much activity  [place]:  Had MRI, then Bx, now treating with Tamsulosin  MILES Montanez at [facility]  Review of Systems   Constitutional:  Positive for activity change and unexpected weight change.   Cardiovascular:  Positive for palpitations. Negative for chest pain.   Genitourinary:  Positive for difficulty urinating, frequency and urgency.   Musculoskeletal:  Positive for back pain and gait problem.   All other systems reviewed and are negative.      Objective   /84   Temp 36.5 °C (97.7 °F)   Ht 1.829 m (6')   Wt 132 kg (290 lb)   BMI 39.33 kg/m²     Physical Exam  Vitals and nursing note reviewed.   Constitutional:       Appearance: He is obese.   HENT:      Head: Normocephalic and atraumatic.      Right Ear: Tympanic membrane, ear canal and external ear normal.      Left Ear: Tympanic membrane, ear canal and external ear normal.   Neck:      Comments: Thoracic and upper back pain   Cardiovascular:      Rate and Rhythm: Normal rate and regular rhythm.      Pulses: Normal pulses.      Heart sounds: Normal heart sounds.   Pulmonary:      Effort: Pulmonary effort is normal.      Breath sounds: Normal breath sounds.   Abdominal:      General: Bowel sounds are normal.      Palpations: Abdomen is soft.   Genitourinary:     Comments: defer  Musculoskeletal:         General: No swelling.      Cervical back: Tenderness present.      Right lower leg: Edema present.      Left lower leg: Edema present.      Comments: 1+ Lower leg edema   Skin:     General: Skin is warm.      Findings: No rash.   Neurological:      Mental Status: He is alert and oriented to person, place, and time.   Psychiatric:         Behavior: Behavior normal.         Thought Content: Thought content normal.          Judgment: Judgment normal.         Assessment/Plan   Assessment & Plan  Need for immunization against influenza    Orders:    Flu vaccine, trivalent, preservative free, HIGH-DOSE, age 65y+ (Fluzone)    Difficulty walking    Orders:    Disability Placard    Routine general medical examination at a health care facility

## 2024-10-01 NOTE — PATIENT INSTRUCTIONS
Good to see you today.  Keep working hard on improving the knee.  Plan to do fasting labs before the next visit (April)  Flu shot today.  Let me know if you need anything.

## 2024-11-18 PROBLEM — R26.2 DIFFICULTY WALKING: Status: ACTIVE | Noted: 2024-11-18

## 2024-11-18 ASSESSMENT — ENCOUNTER SYMPTOMS
UNEXPECTED WEIGHT CHANGE: 1
PALPITATIONS: 1
FREQUENCY: 1
ACTIVITY CHANGE: 1
BACK PAIN: 1
DIFFICULTY URINATING: 1

## 2024-12-06 ENCOUNTER — LAB (OUTPATIENT)
Dept: LAB | Facility: LAB | Age: 69
End: 2024-12-06
Payer: MEDICARE

## 2024-12-06 DIAGNOSIS — R97.20 ELEVATED PSA: Primary | ICD-10-CM

## 2024-12-06 DIAGNOSIS — R97.20 ELEVATED PSA: ICD-10-CM

## 2024-12-06 LAB — PSA SERPL-MCNC: 6.69 NG/ML

## 2024-12-06 PROCEDURE — 36415 COLL VENOUS BLD VENIPUNCTURE: CPT

## 2024-12-06 PROCEDURE — 84153 ASSAY OF PSA TOTAL: CPT

## 2024-12-18 NOTE — PROGRESS NOTES
FUV    Last visit - 6/20/24     HISTORY OF PRESENT ILLNESS:   Anirudh Qiu is a 69 y.o. male who is being seen today for follow up with PSA results    PAST MEDICAL HISTORY:  Past Medical History:   Diagnosis Date    Carbuncle of back (any part, except buttock) 08/20/2019    Carbuncle of back    Chronic pharyngitis 01/30/2018    Sore throat, chronic    Elevated prostate specific antigen (PSA) 12/04/2017    Rising PSA level    Encounter for general adult medical examination without abnormal findings 02/09/2021    Medicare annual wellness visit, initial    Eructation 09/25/2017    Belching    Essential (primary) hypertension 10/19/2022    Essential hypertension    Left leg pain 09/22/2023    Left leg swelling 10/01/2023    Other specified disease of esophagus 11/30/2017    Esophageal pain    Personal history of diseases of the skin and subcutaneous tissue 08/11/2020    History of actinic keratosis    Personal history of other diseases of male genital organs 08/11/2020    History of epididymitis    Personal history of other diseases of the circulatory system 05/12/2020    History of atrial tachycardia    Personal history of other diseases of the circulatory system     History of hypertension    Personal history of other diseases of the digestive system     History of gastroesophageal reflux (GERD)    Personal history of other diseases of the respiratory system 11/22/2017    History of acute pharyngitis    Personal history of other diseases of the respiratory system 11/30/2017    History of acute bronchitis    Personal history of other diseases of the respiratory system 01/10/2018    History of chronic pharyngitis    Personal history of other infectious and parasitic diseases 03/30/2018    History of herpes zoster    Personal history of other specified conditions 04/26/2018    History of dysuria    Personal history of other specified conditions 11/18/2019    History of epigastric pain    Personal history of other  "specified conditions 06/09/2021    History of elevated prostate specific antigen (PSA)    Personal history of other specified conditions 09/25/2017    History of tachycardia    Supraventricular tachycardia (CMS-HCC) 02/09/2021    Supraventricular tachycardia    Unspecified abdominal pain 09/24/2015    Stomach pain   - Negative prostate MRI in 04/2018   - Fhx of prostate CA (uncle)    PAST SURGICAL HISTORY:  Past Surgical History:   Procedure Laterality Date    BACK SURGERY  01/30/2018    Back Surgery  DISCECTOMY  L5-5    KNEE ARTHROSCOPY W/ DEBRIDEMENT  10/05/2015    Knee Arthroscopy (Therapeutic)    KNEE SURGERY Left 01/30/2018    Knee Surgery  - stretched acl    OTHER SURGICAL HISTORY  10/05/2015    Arthroscopy Shoulder Right    SHOULDER SURGERY Right 01/30/2018    Shoulder Surgery     ALLERGIES:   No Known Allergies     MEDICATIONS:   Current Outpatient Medications   Medication Instructions    acetaminophen (TYLENOL) 500 mg    ALPRAZolam (XANAX) 0.5 mg, oral, 3 times daily PRN    famotidine (PEPCID) 20 mg, oral, Nightly, DUE TO SEE DOCTOR IN MAY BEFORE NEXT REFILLS    meloxicam (MOBIC) 15 mg, Daily    metoprolol succinate XL (TOPROL-XL) 100 mg, oral, Daily    pantoprazole (PROTONIX) 40 mg, oral, Daily before breakfast    tamsulosin (FLOMAX) 0.4 mg, oral, Daily      PHYSICAL EXAM:  Blood pressure 149/88, pulse 67.  Constitutional: Patient appears well-developed and well-nourished. No distress.    Pulmonary/Chest: Effort normal. No respiratory distress.   Abdominal: Soft, ND NT  : WNL  Musculoskeletal: Normal range of motion.    Neurological: Alert and oriented to person, place, and time.  Psychiatric: Normal mood and affect. Behavior is normal. Thought content normal.      Labs:  No results found for: \"TESTOSTERONE\"  Lab Results   Component Value Date    PSA 6.69 (H) 12/06/2024     Component      Latest Ref Rng 6/10/2021 6/7/2022 1/15/2024 1/23/2024 12/6/2024   PSA      <=4.00 ng/mL 3.42  3.68  5.43 (H)  6.49 " "(H)  6.69 (H)       Legend:  (H) High  No components found for: \"CBC\"  Lab Results   Component Value Date    CREATININE 1.17 01/15/2024     No components found for: \"TESTOTMS\"  No results found for: \"TESTF\"    Imaging:   - MRI of prostate on 4/19/24 demonstrated A 1.3 cm rounded asymmetric area of decreased ADC value, just right of midline at the right base posterior peripheral zone, is not associated with other signal abnormalities and is potentially an extruded BPH nodule (PI-RADS 3). BPH changes of the transition zone. Diffuse non nodular hypointensities within the peripheral zone, without evidence of focally restricted diffusion (PI-RADS 2).  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   - Results reviewed with patient. Patient reassured.     Discussion:  Doing well, no complaints. Denies any dysuria, hematuria, bothersome urinary frequency, urgency, or flank pain. Patient denies any pushing or straining to urinate. Reviewed pathology results with patient, pt reassured. Discussed possible reasons for elevated PSA and explained process for evaluation. He is on Tamsulosin and we will increase the dose due to side-effects.  Will continue to monitor PSA.   If no improvement of symptoms will rediscuss minimally invasive options.    Assessment:      1. Benign prostatic hyperplasia, unspecified whether lower urinary tract symptoms present  Measure post void residual        Elevated PSA  Anirudh Qiu is a 69 y.o. male here for FUV     Plan:   Follow up in 6 months.  PSA in 3 and 6 months.  Increase Tamsulosin from 1 every day to 2 pills per day.    All questions and concerns were addressed. Patient verbalizes understanding and has no other questions at this time.     Scribe Attestation:  By signing my name below, ICharo Scribe   attest that this documentation has been prepared under the direction and in the presence of Cody Braga MD.   "

## 2024-12-20 ENCOUNTER — OFFICE VISIT (OUTPATIENT)
Dept: UROLOGY | Facility: HOSPITAL | Age: 69
End: 2024-12-20
Payer: MEDICARE

## 2024-12-20 VITALS — SYSTOLIC BLOOD PRESSURE: 149 MMHG | HEART RATE: 67 BPM | DIASTOLIC BLOOD PRESSURE: 88 MMHG

## 2024-12-20 DIAGNOSIS — N40.0 BENIGN PROSTATIC HYPERPLASIA, UNSPECIFIED WHETHER LOWER URINARY TRACT SYMPTOMS PRESENT: ICD-10-CM

## 2024-12-20 DIAGNOSIS — R97.20 ELEVATED PSA: ICD-10-CM

## 2024-12-20 PROCEDURE — 99214 OFFICE O/P EST MOD 30 MIN: CPT | Performed by: UROLOGY

## 2024-12-20 PROCEDURE — G2211 COMPLEX E/M VISIT ADD ON: HCPCS | Performed by: UROLOGY

## 2024-12-20 PROCEDURE — 51798 US URINE CAPACITY MEASURE: CPT | Performed by: UROLOGY

## 2024-12-20 PROCEDURE — 3079F DIAST BP 80-89 MM HG: CPT | Performed by: UROLOGY

## 2024-12-20 PROCEDURE — 3077F SYST BP >= 140 MM HG: CPT | Performed by: UROLOGY

## 2024-12-20 PROCEDURE — 1036F TOBACCO NON-USER: CPT | Performed by: UROLOGY

## 2024-12-20 PROCEDURE — 1159F MED LIST DOCD IN RCRD: CPT | Performed by: UROLOGY

## 2024-12-20 RX ORDER — TAMSULOSIN HYDROCHLORIDE 0.4 MG/1
0.8 CAPSULE ORAL DAILY
Qty: 180 CAPSULE | Refills: 3 | Status: SHIPPED | OUTPATIENT
Start: 2024-12-20 | End: 2025-12-15

## 2024-12-20 ASSESSMENT — PATIENT HEALTH QUESTIONNAIRE - PHQ9
1. LITTLE INTEREST OR PLEASURE IN DOING THINGS: NOT AT ALL
SUM OF ALL RESPONSES TO PHQ9 QUESTIONS 1 AND 2: 0
2. FEELING DOWN, DEPRESSED OR HOPELESS: NOT AT ALL

## 2025-03-11 DIAGNOSIS — R73.09 ELEVATED GLUCOSE: ICD-10-CM

## 2025-03-11 DIAGNOSIS — I10 BENIGN ESSENTIAL HYPERTENSION: Primary | ICD-10-CM

## 2025-03-11 DIAGNOSIS — R53.83 FATIGUE, UNSPECIFIED TYPE: ICD-10-CM

## 2025-03-11 DIAGNOSIS — E78.2 MIXED HYPERLIPIDEMIA: ICD-10-CM

## 2025-03-15 LAB
ALBUMIN SERPL-MCNC: 4.4 G/DL (ref 3.6–5.1)
ALP SERPL-CCNC: 45 U/L (ref 35–144)
ALT SERPL-CCNC: 11 U/L (ref 9–46)
ANION GAP SERPL CALCULATED.4IONS-SCNC: 9 MMOL/L (CALC) (ref 7–17)
AST SERPL-CCNC: 13 U/L (ref 10–35)
BILIRUB SERPL-MCNC: 0.4 MG/DL (ref 0.2–1.2)
BUN SERPL-MCNC: 22 MG/DL (ref 7–25)
CALCIUM SERPL-MCNC: 8.9 MG/DL (ref 8.6–10.3)
CHLORIDE SERPL-SCNC: 105 MMOL/L (ref 98–110)
CHOLEST SERPL-MCNC: 194 MG/DL
CHOLEST/HDLC SERPL: 4 (CALC)
CO2 SERPL-SCNC: 29 MMOL/L (ref 20–32)
CREAT SERPL-MCNC: 1.13 MG/DL (ref 0.7–1.35)
EGFRCR SERPLBLD CKD-EPI 2021: 71 ML/MIN/1.73M2
ERYTHROCYTE [DISTWIDTH] IN BLOOD BY AUTOMATED COUNT: 14.1 % (ref 11–15)
EST. AVERAGE GLUCOSE BLD GHB EST-MCNC: 114 MG/DL
EST. AVERAGE GLUCOSE BLD GHB EST-SCNC: 6.3 MMOL/L
GLUCOSE SERPL-MCNC: 104 MG/DL (ref 65–99)
HBA1C MFR BLD: 5.6 % OF TOTAL HGB
HCT VFR BLD AUTO: 49.7 % (ref 38.5–50)
HDLC SERPL-MCNC: 48 MG/DL
HGB BLD-MCNC: 16.3 G/DL (ref 13.2–17.1)
LDLC SERPL CALC-MCNC: 125 MG/DL (CALC)
MCH RBC QN AUTO: 29.9 PG (ref 27–33)
MCHC RBC AUTO-ENTMCNC: 32.8 G/DL (ref 32–36)
MCV RBC AUTO: 91.2 FL (ref 80–100)
NONHDLC SERPL-MCNC: 146 MG/DL (CALC)
PLATELET # BLD AUTO: 190 THOUSAND/UL (ref 140–400)
PMV BLD REES-ECKER: 9.2 FL (ref 7.5–12.5)
POTASSIUM SERPL-SCNC: 4.3 MMOL/L (ref 3.5–5.3)
PROT SERPL-MCNC: 7 G/DL (ref 6.1–8.1)
RBC # BLD AUTO: 5.45 MILLION/UL (ref 4.2–5.8)
SODIUM SERPL-SCNC: 143 MMOL/L (ref 135–146)
TRIGL SERPL-MCNC: 106 MG/DL
TSH SERPL-ACNC: 1.45 MIU/L (ref 0.4–4.5)
WBC # BLD AUTO: 7.4 THOUSAND/UL (ref 3.8–10.8)

## 2025-03-17 LAB
PSA FREE MFR SERPL: 35 % (CALC)
PSA FREE SERPL-MCNC: 2.2 NG/ML
PSA SERPL-MCNC: 6.3 NG/ML

## 2025-04-02 ENCOUNTER — APPOINTMENT (OUTPATIENT)
Dept: PRIMARY CARE | Facility: CLINIC | Age: 70
End: 2025-04-02
Payer: MEDICARE

## 2025-04-02 VITALS
BODY MASS INDEX: 38.47 KG/M2 | TEMPERATURE: 97.6 F | WEIGHT: 284 LBS | DIASTOLIC BLOOD PRESSURE: 80 MMHG | HEIGHT: 72 IN | SYSTOLIC BLOOD PRESSURE: 124 MMHG

## 2025-04-02 DIAGNOSIS — E78.2 MIXED HYPERLIPIDEMIA: ICD-10-CM

## 2025-04-02 DIAGNOSIS — F41.8 SITUATIONAL ANXIETY: ICD-10-CM

## 2025-04-02 DIAGNOSIS — I48.0 PAROXYSMAL ATRIAL FIBRILLATION (MULTI): ICD-10-CM

## 2025-04-02 DIAGNOSIS — M54.6 CHRONIC THORACIC BACK PAIN, UNSPECIFIED BACK PAIN LATERALITY: ICD-10-CM

## 2025-04-02 DIAGNOSIS — M54.89 OTHER CHRONIC BACK PAIN: ICD-10-CM

## 2025-04-02 DIAGNOSIS — E66.01 OBESITY, MORBID (MULTI): ICD-10-CM

## 2025-04-02 DIAGNOSIS — R26.2 DIFFICULTY WALKING: ICD-10-CM

## 2025-04-02 DIAGNOSIS — I10 PRIMARY HYPERTENSION: Primary | ICD-10-CM

## 2025-04-02 DIAGNOSIS — K21.9 GASTROESOPHAGEAL REFLUX DISEASE WITHOUT ESOPHAGITIS: ICD-10-CM

## 2025-04-02 DIAGNOSIS — G89.29 CHRONIC THORACIC BACK PAIN, UNSPECIFIED BACK PAIN LATERALITY: ICD-10-CM

## 2025-04-02 DIAGNOSIS — G89.29 OTHER CHRONIC BACK PAIN: ICD-10-CM

## 2025-04-02 PROCEDURE — 1160F RVW MEDS BY RX/DR IN RCRD: CPT | Performed by: NURSE PRACTITIONER

## 2025-04-02 PROCEDURE — 99214 OFFICE O/P EST MOD 30 MIN: CPT | Performed by: NURSE PRACTITIONER

## 2025-04-02 PROCEDURE — 3079F DIAST BP 80-89 MM HG: CPT | Performed by: NURSE PRACTITIONER

## 2025-04-02 PROCEDURE — 1036F TOBACCO NON-USER: CPT | Performed by: NURSE PRACTITIONER

## 2025-04-02 PROCEDURE — 3074F SYST BP LT 130 MM HG: CPT | Performed by: NURSE PRACTITIONER

## 2025-04-02 PROCEDURE — 3008F BODY MASS INDEX DOCD: CPT | Performed by: NURSE PRACTITIONER

## 2025-04-02 PROCEDURE — 1159F MED LIST DOCD IN RCRD: CPT | Performed by: NURSE PRACTITIONER

## 2025-04-02 NOTE — PROGRESS NOTES
"Subjective   Patient ID: Anirudh Qiu is a 70 y.o. male who presents for Follow-up (Review labs).    HPI   Here today for follow up on medications and labs.  Was getting ready to come in and had a panic attack.    Wife drove him here today.    Current specialists:    Dr. Garcia, ORTHO  at [facility] - for right shoulder  Dr. Butt [facility]   Dr. Azar, ORTHO, [facility]  - left knee  Dr. Braga, UROLOGY - every 6 months  Exercise - some walking  Back and knee bother.  \"Calf cramps up\"   Sleep is off and on - pretty decent last couple days.  Occasionally wakes in the middle of night.   No further issues with A-Fib symptoms.    Was at family wedding and brother is on GLP1 for weight loss.   Thinks this might help him too.  Has pretty regular BM    Review of Systems   Constitutional:  Positive for activity change and unexpected weight change.   Respiratory:  Negative for shortness of breath, wheezing and stridor.    Cardiovascular:  Positive for leg swelling. Negative for chest pain and palpitations.   Genitourinary:  Positive for difficulty urinating.   Musculoskeletal:  Positive for arthralgias, back pain and gait problem.   Skin:  Positive for color change.        Darker lesion on right cheek   Neurological:  Positive for light-headedness.        Panic feeling at times.     Psychiatric/Behavioral:  The patient is nervous/anxious.        Objective   /80   Temp 36.4 °C (97.6 °F)   Ht 1.829 m (6')   BMI 39.33 kg/m²     Physical Exam  Vitals and nursing note reviewed.   Constitutional:       Appearance: He is obese.   HENT:      Head: Normocephalic and atraumatic.      Right Ear: Tympanic membrane, ear canal and external ear normal.      Left Ear: Tympanic membrane, ear canal and external ear normal.      Mouth/Throat:      Pharynx: Oropharynx is clear.   Cardiovascular:      Rate and Rhythm: Normal rate and regular rhythm.      Pulses: Normal pulses.      Heart sounds: Normal heart sounds. "   Pulmonary:      Effort: Pulmonary effort is normal.      Breath sounds: Normal breath sounds.   Abdominal:      General: Bowel sounds are normal.      Palpations: Abdomen is soft.   Skin:     Findings: No rash.      Comments: Dark purple-red vascular lesion on right cheek - just below glasses.    ~ 6 mm   Neurological:      Mental Status: He is alert and oriented to person, place, and time.   Psychiatric:         Behavior: Behavior normal.         Thought Content: Thought content normal.         Judgment: Judgment normal.      Comments: Anxious mood noted       Assessment/Plan

## 2025-04-02 NOTE — PATIENT INSTRUCTIONS
Good to see you today.  Expect to hear from Clinical Pharmacy.  I should see you in 6 months with labs before.

## 2025-04-03 DIAGNOSIS — I10 PRIMARY HYPERTENSION: ICD-10-CM

## 2025-04-03 RX ORDER — METOPROLOL SUCCINATE 100 MG/1
100 TABLET, EXTENDED RELEASE ORAL DAILY
Qty: 90 TABLET | Refills: 3 | Status: SHIPPED | OUTPATIENT
Start: 2025-04-03

## 2025-04-03 RX ORDER — METOPROLOL SUCCINATE 100 MG/1
100 TABLET, EXTENDED RELEASE ORAL DAILY
Qty: 90 TABLET | Refills: 3 | Status: CANCELLED | OUTPATIENT
Start: 2025-04-03

## 2025-04-05 PROBLEM — Z23 NEED FOR IMMUNIZATION AGAINST INFLUENZA: Status: RESOLVED | Noted: 2023-10-01 | Resolved: 2025-04-05

## 2025-04-05 PROBLEM — B37.0 CANDIDIASIS OF MOUTH: Status: RESOLVED | Noted: 2021-07-01 | Resolved: 2025-04-05

## 2025-04-05 PROBLEM — I48.0 PAROXYSMAL ATRIAL FIBRILLATION (MULTI): Status: ACTIVE | Noted: 2025-04-05

## 2025-04-05 PROBLEM — S39.012A LUMBAR STRAIN: Status: ACTIVE | Noted: 2022-07-22

## 2025-04-05 PROBLEM — T88.7XXA SIDE EFFECT OF MEDICATION: Status: RESOLVED | Noted: 2024-07-05 | Resolved: 2025-04-05

## 2025-04-05 PROBLEM — K21.9 GASTROESOPHAGEAL REFLUX DISEASE WITHOUT ESOPHAGITIS: Status: ACTIVE | Noted: 2022-12-14

## 2025-04-05 PROBLEM — Z86.0101 HISTORY OF ADENOMATOUS POLYP OF COLON: Status: ACTIVE | Noted: 2023-05-17

## 2025-04-05 ASSESSMENT — ENCOUNTER SYMPTOMS
LIGHT-HEADEDNESS: 1
BACK PAIN: 1
NERVOUS/ANXIOUS: 1
ARTHRALGIAS: 1
SHORTNESS OF BREATH: 0
DIFFICULTY URINATING: 1
STRIDOR: 0
ACTIVITY CHANGE: 1
PALPITATIONS: 0
COLOR CHANGE: 1
UNEXPECTED WEIGHT CHANGE: 1
WHEEZING: 0

## 2025-04-11 DIAGNOSIS — M54.89 OTHER CHRONIC BACK PAIN: ICD-10-CM

## 2025-04-11 DIAGNOSIS — E66.01 OBESITY, MORBID (MULTI): Primary | ICD-10-CM

## 2025-04-11 DIAGNOSIS — G89.29 OTHER CHRONIC BACK PAIN: ICD-10-CM

## 2025-04-17 ENCOUNTER — APPOINTMENT (OUTPATIENT)
Dept: PHARMACY | Facility: HOSPITAL | Age: 70
End: 2025-04-17
Payer: MEDICARE

## 2025-04-17 DIAGNOSIS — G89.29 OTHER CHRONIC BACK PAIN: ICD-10-CM

## 2025-04-17 DIAGNOSIS — E66.01 OBESITY, MORBID (MULTI): ICD-10-CM

## 2025-04-17 DIAGNOSIS — M54.89 OTHER CHRONIC BACK PAIN: ICD-10-CM

## 2025-04-17 NOTE — PROGRESS NOTES
Clinical Pharmacy Appointment    Patient ID: Anirudh Qiu is a 70 y.o. male who presents for Weight Management.    Pt is here for First appointment.     Referring Provider: Chika Cordova APRN-C*  PCP: CHADWICK Maher   Last visit with PCP: 4/2/2025   Next visit with PCP: 10/2/2025      Subjective     HPI  WEIGHT MANAGEMENT  PMH significant for hypertension and hyperlipidemia  Special needs/barriers to therapy: cost/coverage of GLP1s    BMI Readings from Last 1 Encounters:   04/02/25 39.06 kg/m²     Lab Results   Component Value Date    HGBA1C 5.6 03/14/2025    GLUCOSE 104 (H) 03/14/2025     Lifestyle  Diet: 3 meals/day  Breakfast: protein bar or bowl of Cheerios  Lunch: yogurt, tortilla chips  Dinner: meat, potatoes, eggs  Snacks: protein bars, tortilla chips  Drinks: Powerade Zero, 36-48 ounces water  Has worked with nutritionist/dietician? No  Physical Activity: limited due to back/knee pain  Alcohol Use: rarely    Non-Pharmacological Therapy  Weight loss techniques attempted:  Self-directed dieting: reduced calorie  Commercial weight loss program: modified [employer], [employer]    Pharmacological Therapy  Current Medications: none for weight loss  Previous Medications: none for weight loss     Pertinent PMH Review:  PMH of Pancreatitis: No  PMH of Gallbladder Disease: Yes, removed in 2019  PMH of Retinopathy: No  PMH of MTC: No     Preventative Care  ASCVD Risk:   The 10-year ASCVD risk score (aDgmar DK, et al., 2019) is: 17.4%    Values used to calculate the score:      Age: 70 years      Sex: Male      Is Non- : No      Diabetic: No      Tobacco smoker: No      Systolic Blood Pressure: 124 mmHg      Is BP treated: Yes      HDL Cholesterol: 48 mg/dL      Total Cholesterol: 194 mg/dL  On Statin: No      Medication System Management  Patient's preferred pharmacy: [facility] in [place], [place]  Adherence/Organization: no concerns identified  Affordability/Accessibility: cost/coverage issues  "with GLP1s  Insurance coverage of weight-loss medications? No  Medicare will only cover GLP1s for type 2 diabetes, established ASCVD, or obstructive sleep apnea  Eligible for copay cards/programs? No, government plan      Objective   Allergies[1]  Social History     Social History Narrative    Not on file      Medication Review  Current Outpatient Medications   Medication Instructions    acetaminophen (TYLENOL) 500 mg    ALPRAZolam (XANAX) 0.5 mg, oral, 3 times daily PRN    famotidine (PEPCID) 20 mg, oral, Nightly, DUE TO SEE DOCTOR IN MAY BEFORE NEXT REFILLS    metoprolol succinate XL (TOPROL-XL) 100 mg, oral, Daily    pantoprazole (PROTONIX) 40 mg, oral, Daily before breakfast    tamsulosin (FLOMAX) 0.8 mg, oral, Daily      Vitals  BP Readings from Last 2 Encounters:   04/02/25 124/80   12/20/24 149/88     BMI Readings from Last 1 Encounters:   04/02/25 39.06 kg/m²      Labs  A1C  Lab Results   Component Value Date    HGBA1C 5.6 03/14/2025    HGBA1C 5.4 01/15/2024     BMP  Lab Results   Component Value Date    CALCIUM 8.9 03/14/2025     03/14/2025    K 4.3 03/14/2025    CO2 29 03/14/2025     03/14/2025    BUN 22 03/14/2025    CREATININE 1.13 03/14/2025    EGFR 71 03/14/2025     LFTs  Lab Results   Component Value Date    ALT 11 03/14/2025    AST 13 03/14/2025    ALKPHOS 45 03/14/2025    BILITOT 0.4 03/14/2025     FLP  Lab Results   Component Value Date    TRIG 106 03/14/2025    CHOL 194 03/14/2025    LDLF 93 06/07/2022    LDLCALC 125 (H) 03/14/2025    HDL 48 03/14/2025     Urine Microalbumin  No results found for: \"MICROALBCREA\"  Weight Management  Wt Readings from Last 3 Encounters:   04/02/25 129 kg (284 lb)   10/01/24 132 kg (290 lb)   06/27/24 122 kg (270 lb)      There is no height or weight on file to calculate BMI.     Assessment/Plan   Problem List Items Addressed This Visit       Obesity, morbid (Multi)    Chronic back pain       DISCUSSION/PLAN:  Patient with hypertension and hyperlipidemia " interested in starting GLP1 for weight loss and other benefits. Medicare will only cover GLP1s for type 2 diabetes, established ASCVD, or obstructive sleep apnea. Patient does have high risk of ERUM based on STOP-BANG criteria, but he has never been evaluated and is not interested in further workup at this time. He is also not interested in proceeding with cash price options. Follow up as needed for further questions, insurance changes, etc.    No medication changes at this time    Monitoring and Education:  Counseled on GLP1/GIP mechanism of action, benefits, side effects, monitoring, and potential complications  Answered all patient/family questions    Continue all meds under the continuation of care with the referring provider and clinical pharmacy team.    Clinical Pharmacist follow-up: as needed  Orders placed: none at this time    Thank you,   Dodie Turner, PharmD  Clinical Pharmacy Specialist, Primary Care   [phone]    Verbal consent to manage patient's drug therapy was obtained from the patient and wife. Patient was informed they may decline to participate or withdraw from participation in pharmacy services at any time.         [1] No Known Allergies

## 2025-04-23 DIAGNOSIS — K21.9 GASTROESOPHAGEAL REFLUX DISEASE WITHOUT ESOPHAGITIS: ICD-10-CM

## 2025-04-23 DIAGNOSIS — I10 PRIMARY HYPERTENSION: ICD-10-CM

## 2025-04-23 RX ORDER — METOPROLOL SUCCINATE 100 MG/1
100 TABLET, EXTENDED RELEASE ORAL DAILY
Qty: 90 TABLET | Refills: 3 | Status: SHIPPED | OUTPATIENT
Start: 2025-04-23

## 2025-04-23 RX ORDER — PANTOPRAZOLE SODIUM 40 MG/1
40 TABLET, DELAYED RELEASE ORAL
Qty: 90 TABLET | Refills: 3 | Status: SHIPPED | OUTPATIENT
Start: 2025-04-23

## 2025-04-24 DIAGNOSIS — K21.9 GASTROESOPHAGEAL REFLUX DISEASE, UNSPECIFIED WHETHER ESOPHAGITIS PRESENT: ICD-10-CM

## 2025-04-24 RX ORDER — FAMOTIDINE 20 MG/1
20 TABLET, FILM COATED ORAL NIGHTLY
Qty: 90 TABLET | Refills: 0 | Status: SHIPPED | OUTPATIENT
Start: 2025-04-24

## 2025-05-05 ENCOUNTER — OFFICE VISIT (OUTPATIENT)
Dept: PRIMARY CARE | Facility: CLINIC | Age: 70
End: 2025-05-05
Payer: MEDICARE

## 2025-05-05 VITALS
SYSTOLIC BLOOD PRESSURE: 140 MMHG | OXYGEN SATURATION: 96 % | TEMPERATURE: 97.8 F | HEART RATE: 72 BPM | DIASTOLIC BLOOD PRESSURE: 90 MMHG

## 2025-05-05 DIAGNOSIS — J40 BRONCHITIS: Primary | ICD-10-CM

## 2025-05-05 DIAGNOSIS — J01.00 ACUTE NON-RECURRENT MAXILLARY SINUSITIS: ICD-10-CM

## 2025-05-05 PROCEDURE — 1036F TOBACCO NON-USER: CPT | Performed by: NURSE PRACTITIONER

## 2025-05-05 PROCEDURE — 3080F DIAST BP >= 90 MM HG: CPT | Performed by: NURSE PRACTITIONER

## 2025-05-05 PROCEDURE — 1159F MED LIST DOCD IN RCRD: CPT | Performed by: NURSE PRACTITIONER

## 2025-05-05 PROCEDURE — 99214 OFFICE O/P EST MOD 30 MIN: CPT | Performed by: NURSE PRACTITIONER

## 2025-05-05 PROCEDURE — 3077F SYST BP >= 140 MM HG: CPT | Performed by: NURSE PRACTITIONER

## 2025-05-05 PROCEDURE — 1160F RVW MEDS BY RX/DR IN RCRD: CPT | Performed by: NURSE PRACTITIONER

## 2025-05-05 RX ORDER — DOXYCYCLINE 100 MG/1
100 CAPSULE ORAL 2 TIMES DAILY
Qty: 20 CAPSULE | Refills: 0 | Status: SHIPPED | OUTPATIENT
Start: 2025-05-05 | End: 2025-05-15

## 2025-05-05 RX ORDER — BENZONATATE 200 MG/1
200 CAPSULE ORAL 3 TIMES DAILY PRN
Qty: 20 CAPSULE | Refills: 0 | Status: SHIPPED | OUTPATIENT
Start: 2025-05-05 | End: 2025-05-12

## 2025-05-05 RX ORDER — FLUTICASONE PROPIONATE 50 MCG
2 SPRAY, SUSPENSION (ML) NASAL DAILY
Qty: 16 G | Refills: 0 | Status: SHIPPED | OUTPATIENT
Start: 2025-05-05 | End: 2026-05-05

## 2025-05-05 ASSESSMENT — ENCOUNTER SYMPTOMS
CONSTITUTIONAL NEGATIVE: 1
NEUROLOGICAL NEGATIVE: 1
COUGH: 1
MUSCULOSKELETAL NEGATIVE: 1
PSYCHIATRIC NEGATIVE: 1
CARDIOVASCULAR NEGATIVE: 1

## 2025-05-05 NOTE — PROGRESS NOTES
Subjective   Patient ID: Anirudh Qiu is a 70 y.o. male who presents for Cough (X 2 weeks).    HPI Presents today with 2 week history of not feeling well.  Started with sore throat 4 days into that he started to cough and get nasal congestion  Does now have post nasal drainage which also seems to trigger the cough  Talk and eating really triggers it  Can hear some wheezing  No fever  Cough drops help.  Tea with honey help.  Tried  Mcinex DM yesterday, did not help    Review of Systems   Constitutional: Negative.    HENT:          As noted in HPI     Respiratory:  Positive for cough.    Cardiovascular: Negative.    Musculoskeletal: Negative.    Neurological: Negative.    Psychiatric/Behavioral: Negative.         Objective   /90   Pulse 72   Temp 36.6 °C (97.8 °F)   SpO2 96%     Physical Exam  Constitutional:       Appearance: Normal appearance.   HENT:      Right Ear: Tympanic membrane, ear canal and external ear normal.      Left Ear: Tympanic membrane, ear canal and external ear normal.      Nose: Nose normal.      Mouth/Throat:      Mouth: Mucous membranes are moist.      Pharynx: Oropharynx is clear.   Cardiovascular:      Rate and Rhythm: Normal rate and regular rhythm.   Pulmonary:      Effort: Pulmonary effort is normal.      Breath sounds: Normal breath sounds.      Comments: Dry persistent cough  Musculoskeletal:         General: Normal range of motion.   Neurological:      General: No focal deficit present.      Mental Status: He is alert.   Psychiatric:         Mood and Affect: Mood normal.         Behavior: Behavior normal.         Assessment/Plan   Problem List Items Addressed This Visit    None  Visit Diagnoses         Codes      Bronchitis    -  Primary J40    Relevant Medications    doxycycline (Vibramycin) 100 mg capsule    benzonatate (Tessalon) 200 mg capsule      Acute non-recurrent maxillary sinusitis     J01.00    Relevant Medications    fluticasone (Flonase) 50 mcg/actuation nasal  spray

## 2025-05-05 NOTE — PATIENT INSTRUCTIONS
Take the antibiotic as directed.   Use the fluticasone as directed for 2 weeks.   Can use the cough medication every 8 hours as needed.   Let us know in 4-5 days if no better.

## 2025-05-27 DIAGNOSIS — J01.00 ACUTE NON-RECURRENT MAXILLARY SINUSITIS: ICD-10-CM

## 2025-05-29 ENCOUNTER — OFFICE VISIT (OUTPATIENT)
Dept: PRIMARY CARE | Facility: CLINIC | Age: 70
End: 2025-05-29
Payer: MEDICARE

## 2025-05-29 VITALS
BODY MASS INDEX: 38.92 KG/M2 | WEIGHT: 283 LBS | SYSTOLIC BLOOD PRESSURE: 142 MMHG | DIASTOLIC BLOOD PRESSURE: 80 MMHG | TEMPERATURE: 97.6 F

## 2025-05-29 DIAGNOSIS — M25.562 ACUTE PAIN OF LEFT KNEE: ICD-10-CM

## 2025-05-29 DIAGNOSIS — M54.40 ACUTE BILATERAL LOW BACK PAIN WITH SCIATICA, SCIATICA LATERALITY UNSPECIFIED: Primary | ICD-10-CM

## 2025-05-29 DIAGNOSIS — M54.42 CHRONIC BILATERAL LOW BACK PAIN WITH LEFT-SIDED SCIATICA: ICD-10-CM

## 2025-05-29 DIAGNOSIS — G89.29 CHRONIC BILATERAL LOW BACK PAIN WITH LEFT-SIDED SCIATICA: ICD-10-CM

## 2025-05-29 PROCEDURE — 1160F RVW MEDS BY RX/DR IN RCRD: CPT | Performed by: NURSE PRACTITIONER

## 2025-05-29 PROCEDURE — 99214 OFFICE O/P EST MOD 30 MIN: CPT | Performed by: NURSE PRACTITIONER

## 2025-05-29 PROCEDURE — 1159F MED LIST DOCD IN RCRD: CPT | Performed by: NURSE PRACTITIONER

## 2025-05-29 PROCEDURE — 3077F SYST BP >= 140 MM HG: CPT | Performed by: NURSE PRACTITIONER

## 2025-05-29 PROCEDURE — 3079F DIAST BP 80-89 MM HG: CPT | Performed by: NURSE PRACTITIONER

## 2025-05-29 RX ORDER — PREDNISONE 20 MG/1
TABLET ORAL
Qty: 24 TABLET | Refills: 0 | Status: SHIPPED | OUTPATIENT
Start: 2025-05-29

## 2025-05-29 RX ORDER — FLUTICASONE PROPIONATE 50 MCG
SPRAY, SUSPENSION (ML) NASAL
Qty: 48 ML | Refills: 3 | Status: SHIPPED | OUTPATIENT
Start: 2025-05-29 | End: 2025-05-29 | Stop reason: WASHOUT

## 2025-05-29 RX ORDER — CYCLOBENZAPRINE HCL 5 MG
TABLET ORAL
Qty: 40 TABLET | Refills: 0 | Status: SHIPPED | OUTPATIENT
Start: 2025-05-29

## 2025-05-29 ASSESSMENT — PATIENT HEALTH QUESTIONNAIRE - PHQ9
2. FEELING DOWN, DEPRESSED OR HOPELESS: NOT AT ALL
SUM OF ALL RESPONSES TO PHQ9 QUESTIONS 1 AND 2: 0
1. LITTLE INTEREST OR PLEASURE IN DOING THINGS: NOT AT ALL

## 2025-05-29 NOTE — ASSESSMENT & PLAN NOTE
Orders:    predniSONE (Deltasone) 20 mg tablet; Take one pill three times per day x 4 days, then twice daily x 4 days, then once daily x 4 days   TAKE WITH FOOD    Referral to Physical Therapy; Future    XR lumbar spine 2-3 views; Future

## 2025-05-29 NOTE — PROGRESS NOTES
"Subjective   Patient ID: Anirudh Qiu is a 70 y.o. male who presents for Back Pain (Low back pain x's 4-5 weeks).  HPI  Since in High School back would \"go out\"  Hx of \"Slipped disc and pinched nerve\"   Self- does Lumbar Roll 3-5 days and then feels some muscle soreness but then acute pain is gone  Top of buttocks and up 5-6 inches on both sides    Walking with cart seems OK    Walking alone or standing is a problem  After 5-10 minutes has to sit  Week before Easter 'back went out'  Back in by Easter but then has never gotten the rest of the way better.  Does not wake from sleep  Rolling over increases pain.    Legs feel heavy in upper thigh  No chiropractor care or any care yet  Sitting 2-3    Standing 6-7   Walking 6-7  Occasionally 2 Tylenol and 2 Ibuprofen and iced and felt some temporary relief  Steps area a struggle - up is harder than down.   No stretching  Lower back MRI 2015  and x-ray   Has not been doing much day to day.  No change with bowels or bladder    Review of Systems   Constitutional:  Positive for activity change.   Cardiovascular:  Positive for palpitations and leg swelling. Negative for chest pain.   Musculoskeletal:  Positive for back pain and gait problem.   Skin:  Negative for rash.   Psychiatric/Behavioral:  Positive for dysphoric mood and sleep disturbance.        Objective   Physical Exam  Vitals and nursing note reviewed.   Constitutional:       Appearance: He is obese.   HENT:      Head: Normocephalic and atraumatic.   Cardiovascular:      Rate and Rhythm: Normal rate and regular rhythm.      Pulses: Normal pulses.      Heart sounds: Normal heart sounds.   Pulmonary:      Effort: Pulmonary effort is normal.      Breath sounds: Normal breath sounds.   Abdominal:      General: Bowel sounds are normal.      Palpations: Abdomen is soft.   Musculoskeletal:         General: Tenderness present.      Right lower leg: Edema present.      Left lower leg: Edema present.      Comments: Minimal " movement t/o the OV and seems laborious when he does move.  Poor flexibility and increased c/o pain with minimal activity   Skin:     General: Skin is warm.      Findings: No rash.   Neurological:      Mental Status: He is alert and oriented to person, place, and time.   Psychiatric:         Behavior: Behavior normal.      Comments: Good eye contact, pleasant depressed mood       Also requests PT order for left knee  Assessment & Plan  Acute bilateral low back pain with sciatica, sciatica laterality unspecified    Orders:    predniSONE (Deltasone) 20 mg tablet; Take one pill three times per day x 4 days, then twice daily x 4 days, then once daily x 4 days   TAKE WITH FOOD    cyclobenzaprine (Flexeril) 5 mg tablet; Take one -two tabs up to three times per day as needed muscle spasm    Referral to Physical Therapy; Future    XR lumbar spine 2-3 views; Future    Chronic bilateral low back pain with left-sided sciatica    Orders:    predniSONE (Deltasone) 20 mg tablet; Take one pill three times per day x 4 days, then twice daily x 4 days, then once daily x 4 days   TAKE WITH FOOD    Referral to Physical Therapy; Future    XR lumbar spine 2-3 views; Future    Acute pain of left knee    Orders:    Referral to Physical Therapy; Future              CHADWICK Maher 05/29/25 11:42 AM

## 2025-05-29 NOTE — PATIENT INSTRUCTIONS
Xray lumbar spine  Physical Therapy Low back and Knee  Start Prednisone - take one pill three times per day x 4 days, then twice daily x 4 days, then once daily until gone.  TAKE WITH FOOD  Use Cyclobenzaprine one-two pills 2-3 times per day as needed for muscle spasm.  NO NSAIDS when on Prednisone.  Refer to Dr. Lopez's group.

## 2025-06-01 PROBLEM — M54.40 ACUTE BILATERAL LOW BACK PAIN WITH SCIATICA: Status: ACTIVE | Noted: 2025-06-01

## 2025-06-01 ASSESSMENT — ENCOUNTER SYMPTOMS
SLEEP DISTURBANCE: 1
DYSPHORIC MOOD: 1
BACK PAIN: 1
ACTIVITY CHANGE: 1
PALPITATIONS: 1

## 2025-06-01 NOTE — ASSESSMENT & PLAN NOTE
Orders:    predniSONE (Deltasone) 20 mg tablet; Take one pill three times per day x 4 days, then twice daily x 4 days, then once daily x 4 days   TAKE WITH FOOD    cyclobenzaprine (Flexeril) 5 mg tablet; Take one -two tabs up to three times per day as needed muscle spasm    Referral to Physical Therapy; Future    XR lumbar spine 2-3 views; Future

## 2025-06-17 LAB
PSA FREE MFR SERPL: 36 % (CALC)
PSA FREE SERPL-MCNC: 2.9 NG/ML
PSA SERPL-MCNC: 8.1 NG/ML

## 2025-06-23 NOTE — PROGRESS NOTES
FUV    Last visit - 12/20/24     HISTORY OF PRESENT ILLNESS:   Anirudh Qiu is a 70 y.o. male who is being seen today for follow up with PSA results  - Negative prostate MRI in 04/2018   MRI of prostate on 4/19/24 demonstrated A 1.3 cm rounded asymmetric area of decreased ADC value, just right of midline at the right base posterior peripheral zone, is not associated with other signal abnormalities and is potentially an extruded BPH nodule (PI-RADS 3). BPH changes of the transition zone. Diffuse non nodular hypointensities within the peripheral zone, without evidence of focally restricted diffusion (PI-RADS 2).  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   - Fhx of prostate CA (uncle)  PAST MEDICAL HISTORY:  Past Medical History:   Diagnosis Date    Candidiasis of mouth 07/01/2021    Carbuncle of back (any part, except buttock) 08/20/2019    Carbuncle of back    Chronic pharyngitis 01/30/2018    Sore throat, chronic    Elevated prostate specific antigen (PSA) 12/04/2017    Rising PSA level    Encounter for general adult medical examination without abnormal findings 02/09/2021    Medicare annual wellness visit, initial    Eructation 09/25/2017    Belching    Essential (primary) hypertension 10/19/2022    Essential hypertension    Left leg pain 09/22/2023    Left leg swelling 10/01/2023    Need for immunization against influenza 10/01/2023    Other specified disease of esophagus 11/30/2017    Esophageal pain    Personal history of diseases of the skin and subcutaneous tissue 08/11/2020    History of actinic keratosis    Personal history of other diseases of male genital organs 08/11/2020    History of epididymitis    Personal history of other diseases of the circulatory system 05/12/2020    History of atrial tachycardia    Personal history of other diseases of the circulatory system     History of hypertension    Personal history of other diseases of the digestive system      History of gastroesophageal reflux (GERD)    Personal history of other diseases of the respiratory system 11/22/2017    History of acute pharyngitis    Personal history of other diseases of the respiratory system 11/30/2017    History of acute bronchitis    Personal history of other diseases of the respiratory system 01/10/2018    History of chronic pharyngitis    Personal history of other infectious and parasitic diseases 03/30/2018    History of herpes zoster    Personal history of other specified conditions 04/26/2018    History of dysuria    Personal history of other specified conditions 11/18/2019    History of epigastric pain    Personal history of other specified conditions 06/09/2021    History of elevated prostate specific antigen (PSA)    Personal history of other specified conditions 09/25/2017    History of tachycardia    Side effect of medication 07/05/2024    Supraventricular tachycardia 02/09/2021    Supraventricular tachycardia    Unspecified abdominal pain 09/24/2015    Stomach pain   - Negative prostate MRI in 04/2018   - Fhx of prostate CA (uncle)    PAST SURGICAL HISTORY:  Past Surgical History:   Procedure Laterality Date    BACK SURGERY  01/30/2018    Back Surgery  DISCECTOMY  L5-5    KNEE ARTHROSCOPY W/ DEBRIDEMENT  10/05/2015    Knee Arthroscopy (Therapeutic)    KNEE SURGERY Left 01/30/2018    Knee Surgery  - stretched acl    OTHER SURGICAL HISTORY  10/05/2015    Arthroscopy Shoulder Right    SHOULDER SURGERY Right 01/30/2018    Shoulder Surgery     ALLERGIES:   No Known Allergies     MEDICATIONS:   Current Outpatient Medications   Medication Instructions    acetaminophen (TYLENOL) 500 mg    ALPRAZolam (XANAX) 0.5 mg, oral, 3 times daily PRN    cyclobenzaprine (Flexeril) 5 mg tablet Take one -two tabs up to three times per day as needed muscle spasm    famotidine (PEPCID) 20 mg, oral, Nightly, DUE TO SEE DOCTOR IN MAY BEFORE NEXT REFILLS    metoprolol succinate XL (TOPROL-XL) 100 mg, oral,  "Daily    pantoprazole (PROTONIX) 40 mg, oral, Daily before breakfast    predniSONE (Deltasone) 20 mg tablet Take one pill three times per day x 4 days, then twice daily x 4 days, then once daily x 4 days   TAKE WITH FOOD    tamsulosin (FLOMAX) 0.8 mg, oral, Daily      PHYSICAL EXAM:  There were no vitals taken for this visit.  Constitutional: Patient appears well-developed and well-nourished. No distress.    Pulmonary/Chest: Effort normal. No respiratory distress.   Abdominal: Soft, ND NT  : WNL  Musculoskeletal: Normal range of motion.    Neurological: Alert and oriented to person, place, and time.  Psychiatric: Normal mood and affect. Behavior is normal. Thought content normal.      Labs:  No results found for: \"TESTOSTERONE\"  Lab Results   Component Value Date    PSA 8.1 (H) 06/16/2025     Lab Results   Component Value Date    PSA 8.1 (H) 06/16/2025    PSA 6.3 (H) 03/14/2025    PSA 6.69 (H) 12/06/2024    PSA 6.49 (H) 01/23/2024    PSA 5.43 (H) 01/15/2024    PSA 3.68 06/07/2022    PSA 3.42 06/10/2021    PSA 3.50 10/06/2020       No components found for: \"CBC\"  Lab Results   Component Value Date    CREATININE 1.13 03/14/2025     No components found for: \"TESTOTMS\"  No results found for: \"TESTF\"    Imaging:   - MRI of prostate on 4/19/24 demonstrated A 1.3 cm rounded asymmetric area of decreased ADC value, just right of midline at the right base posterior peripheral zone, is not associated with other signal abnormalities and is potentially an extruded BPH nodule (PI-RADS 3). BPH changes of the transition zone. Diffuse non nodular hypointensities within the peripheral zone, without evidence of focally restricted diffusion (PI-RADS 2).  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   - Results reviewed with patient. Patient reassured.     Discussion:  Urinary symptoms unchanged. PSA is elevated. He still is unable to empty his bladder completely. He is taking Tamsulosin without " any benefits. Denies any dysuria, hematuria, bothersome urinary frequency, urgency, or flank pain. Patient denies any pushing or straining to urinate. If there is no improvement of symptoms and PSA continues to be elevated, we will repeat MRI and re-discuss minimally invasive procedure. Will continue to monitor PSA.      PVR 52  AUA 16  KALIE 2    Assessment:      1. Elevated PSA  PSA, Total and Free    PSA, Total and Free      2. Lower urinary tract symptoms (LUTS)  Measure post void residual          Elevated PSA  Anirudh Qiu is a 70 y.o. male here for FUV     Plan:   Follow up in 6 months.  PSA in 3 and 6 months.  Discontinue Tamsulosin.   Will send rx of daily dosing of Cialis 5mg.  If his PSA continues to be elevated and symptoms continue to be bothersome, we will repeat MRI and rediscuss minimally invasive procedure.  All questions and concerns were addressed. Patient verbalizes understanding and has no other questions at this time.     Scribe Attestation:  By signing my name below, Jerica ORTEGA Scribe   attest that this documentation has been prepared under the direction and in the presence of Cody Braga MD.

## 2025-06-26 ENCOUNTER — OFFICE VISIT (OUTPATIENT)
Dept: UROLOGY | Facility: HOSPITAL | Age: 70
End: 2025-06-26
Payer: MEDICARE

## 2025-06-26 DIAGNOSIS — R97.20 ELEVATED PSA: Primary | ICD-10-CM

## 2025-06-26 DIAGNOSIS — R39.9 LOWER URINARY TRACT SYMPTOMS (LUTS): ICD-10-CM

## 2025-06-26 PROCEDURE — 1159F MED LIST DOCD IN RCRD: CPT | Performed by: UROLOGY

## 2025-06-26 PROCEDURE — G2211 COMPLEX E/M VISIT ADD ON: HCPCS | Performed by: UROLOGY

## 2025-06-26 PROCEDURE — 99214 OFFICE O/P EST MOD 30 MIN: CPT | Performed by: UROLOGY

## 2025-06-26 PROCEDURE — 51798 US URINE CAPACITY MEASURE: CPT | Performed by: UROLOGY

## 2025-06-30 ENCOUNTER — APPOINTMENT (OUTPATIENT)
Dept: CARDIOLOGY | Facility: CLINIC | Age: 70
End: 2025-06-30
Payer: MEDICARE

## 2025-07-01 ENCOUNTER — OFFICE VISIT (OUTPATIENT)
Dept: CARDIOLOGY | Facility: CLINIC | Age: 70
End: 2025-07-01
Payer: MEDICARE

## 2025-07-01 VITALS
SYSTOLIC BLOOD PRESSURE: 121 MMHG | DIASTOLIC BLOOD PRESSURE: 80 MMHG | BODY MASS INDEX: 37.41 KG/M2 | WEIGHT: 272 LBS | HEART RATE: 74 BPM

## 2025-07-01 DIAGNOSIS — I10 BENIGN ESSENTIAL HYPERTENSION: ICD-10-CM

## 2025-07-01 DIAGNOSIS — I10 PRIMARY HYPERTENSION: Primary | ICD-10-CM

## 2025-07-01 DIAGNOSIS — I48.0 PAROXYSMAL ATRIAL FIBRILLATION (MULTI): ICD-10-CM

## 2025-07-01 DIAGNOSIS — E78.2 MIXED HYPERLIPIDEMIA: ICD-10-CM

## 2025-07-01 PROCEDURE — 3079F DIAST BP 80-89 MM HG: CPT | Performed by: INTERNAL MEDICINE

## 2025-07-01 PROCEDURE — 1036F TOBACCO NON-USER: CPT | Performed by: INTERNAL MEDICINE

## 2025-07-01 PROCEDURE — 1160F RVW MEDS BY RX/DR IN RCRD: CPT | Performed by: INTERNAL MEDICINE

## 2025-07-01 PROCEDURE — 99212 OFFICE O/P EST SF 10 MIN: CPT

## 2025-07-01 PROCEDURE — 3074F SYST BP LT 130 MM HG: CPT | Performed by: INTERNAL MEDICINE

## 2025-07-01 PROCEDURE — 1159F MED LIST DOCD IN RCRD: CPT | Performed by: INTERNAL MEDICINE

## 2025-07-01 PROCEDURE — 99213 OFFICE O/P EST LOW 20 MIN: CPT | Performed by: INTERNAL MEDICINE

## 2025-07-01 NOTE — PROGRESS NOTES
Chief Complaint:   Atrial Fibrillation     History of Present Illness     Anirudh Qiu is a 70 y.o. male presenting with for routine follow-up of paroxysmal atrial fibrillation s/p PVI 12/14/22.  The patient initially presented with symptoms of palpitations.  The patient has been maintaining sinus rhythm on medical treatment which they are tolerating well and are compliant.  The current treatment strategy is rhythm control.  The CHADS2-VASC2 score is 2  and the ACC/AHA guidelines recommend anticoagulation for stroke prophylaxis.  The patient is being treated with no OAC per EP s/p PVI.   No CP or HOLLINGSWORTH.  No palpitations.    Review of Systems  All pertinent systems have been reviewed and are negative except for what is stated in the history of present illness.    All other systems have been reviewed and are negative and noncontributory to this patient's current ailments.  .       Previous History     Past Medical History:  He has a past medical history of Candidiasis of mouth (07/01/2021), Carbuncle of back (any part, except buttock) (08/20/2019), Chronic pharyngitis (01/30/2018), Elevated prostate specific antigen (PSA) (12/04/2017), Encounter for general adult medical examination without abnormal findings (02/09/2021), Eructation (09/25/2017), Essential (primary) hypertension (10/19/2022), Left leg pain (09/22/2023), Left leg swelling (10/01/2023), Need for immunization against influenza (10/01/2023), Other specified disease of esophagus (11/30/2017), Personal history of diseases of the skin and subcutaneous tissue (08/11/2020), Personal history of other diseases of male genital organs (08/11/2020), Personal history of other diseases of the circulatory system (05/12/2020), Personal history of other diseases of the circulatory system, Personal history of other diseases of the digestive system, Personal history of other diseases of the respiratory system (11/22/2017), Personal history of other diseases of the  respiratory system (11/30/2017), Personal history of other diseases of the respiratory system (01/10/2018), Personal history of other infectious and parasitic diseases (03/30/2018), Personal history of other specified conditions (04/26/2018), Personal history of other specified conditions (11/18/2019), Personal history of other specified conditions (06/09/2021), Personal history of other specified conditions (09/25/2017), Side effect of medication (07/05/2024), Supraventricular tachycardia (02/09/2021), and Unspecified abdominal pain (09/24/2015).    Past Surgical History:  He has a past surgical history that includes Other surgical history (10/05/2015); Knee arthroscopy w/ debridement (10/05/2015); Back surgery (01/30/2018); Shoulder surgery (Right, 01/30/2018); and Knee surgery (Left, 01/30/2018).      Social History:  He reports that he has never smoked. He has never been exposed to tobacco smoke. He has never used smokeless tobacco. He reports that he does not currently use alcohol. He reports that he does not use drugs.    Family History:  Family History   Problem Relation Name Age of Onset    Other (seasonal/environmental allergies) Mother      Other (heart problem) Father      Mitral valve prolapse Father      Hypertension Father      Benign prostatic hyperplasia Father      Other (heart problem) Sister      Hypertension Brother      Atrial fibrillation Brother      Colon polyps Brother      No Known Problems Son      No Known Problems Son      Migraines Son      Colon cancer Father's Sister      Colon cancer Father's Brother      Prostate cancer Other MAT Cousin         Allergies:  Patient has no known allergies.    Outpatient Medications:  Current Outpatient Medications   Medication Instructions    acetaminophen (TYLENOL) 500 mg    ALPRAZolam (XANAX) 0.5 mg, oral, 3 times daily PRN    cyclobenzaprine (Flexeril) 5 mg tablet Take one -two tabs up to three times per day as needed muscle spasm    famotidine  (PEPCID) 20 mg, oral, Nightly, DUE TO SEE DOCTOR IN MAY BEFORE NEXT REFILLS    metoprolol succinate XL (TOPROL-XL) 100 mg, oral, Daily    pantoprazole (PROTONIX) 40 mg, oral, Daily before breakfast    predniSONE (Deltasone) 20 mg tablet Take one pill three times per day x 4 days, then twice daily x 4 days, then once daily x 4 days   TAKE WITH FOOD    tamsulosin (FLOMAX) 0.8 mg, oral, Daily       Physical Examination   Vitals:  Visit Vitals  /80 (BP Location: Left arm, Patient Position: Sitting, BP Cuff Size: Large adult)   Pulse 74   Wt 123 kg (272 lb) Comment: pt reported   BMI 37.41 kg/m²   Smoking Status Never   BSA 2.49 m²    Physical Exam  Vitals reviewed.   Constitutional:       General: He is not in acute distress.     Appearance: Normal appearance.   HENT:      Head: Normocephalic and atraumatic.      Nose: Nose normal.   Eyes:      Conjunctiva/sclera: Conjunctivae normal.   Cardiovascular:      Rate and Rhythm: Normal rate and regular rhythm.      Pulses: Normal pulses.      Heart sounds: No murmur heard.  Pulmonary:      Effort: Pulmonary effort is normal. No respiratory distress.      Breath sounds: Normal breath sounds. No wheezing, rhonchi or rales.   Abdominal:      General: Bowel sounds are normal. There is no distension.      Palpations: Abdomen is soft.      Tenderness: There is no abdominal tenderness.   Musculoskeletal:         General: No swelling.      Right lower leg: No edema.      Left lower leg: No edema.   Skin:     General: Skin is warm and dry.      Capillary Refill: Capillary refill takes less than 2 seconds.   Neurological:      General: No focal deficit present.      Mental Status: He is alert.   Psychiatric:         Mood and Affect: Mood normal.             Labs/Imaging/Cardiac Studies     Last Labs:  CBC -  Lab Results   Component Value Date    WBC 7.4 03/14/2025    HGB 16.3 03/14/2025    HCT 49.7 03/14/2025    MCV 91.2 03/14/2025     03/14/2025       CMP -  Lab Results    Component Value Date    CALCIUM 8.9 03/14/2025    PHOS 3.7 09/26/2019    PROT 7.0 03/14/2025    ALBUMIN 4.4 03/14/2025    AST 13 03/14/2025    ALT 11 03/14/2025    ALKPHOS 45 03/14/2025    BILITOT 0.4 03/14/2025       LIPID PANEL -   Lab Results   Component Value Date    CHOL 194 03/14/2025    HDL 48 03/14/2025    CHHDL 4.0 03/14/2025    VLDL 17 01/15/2024    TRIG 106 03/14/2025    NHDL 146 (H) 03/14/2025       RENAL FUNCTION PANEL -   Lab Results   Component Value Date    K 4.3 03/14/2025    PHOS 3.7 09/26/2019       Lab Results   Component Value Date    HGBA1C 5.6 03/14/2025       Reviewed Labs    Echo:  No echocardiogram results found for the past 12 months       Assessment and Recommendations     Assessment/Plan       1. Paroxysmal atrial fibrillation (Multi)  The patient has been clinically stable, asymptomatic, and maintaining normal sinus rhythm.  They will continue treatment with rate-controlling medications (metoprolol).  - Follow Up In Cardiology; Future    - Follow Up In Cardiology; Future    2. Benign essential hypertension  The patient's blood pressure has been well-controlled at today's appointment or by recent primary care provider's measurements/home measurements and meets their goal blood pressure per the ACC/AHA guidelines.  The patient has been compliant with their anti-hypertensive medications and is following a low sodium/DASH diet. I advised continuation of their present medical treatment and lifestyle modification.      - Follow Up In Cardiology; Future     Anirudh Qiu will return in 1 year for an office visit.       Fercho Sprague MD    Exclusive of any other services or procedures performed, I, Fercho Sprague MD , spent 30 minutes in duration for this visit today.  This time consisted of chart review, obtaining history, and/or performing the exam as documented above as well as documenting the clinical information for the encounter in the electronic record, discussing treatment options,  plans, and/or goals with patient, family, and/or caregiver, refilling medications, updating the electronic record, ordering medicines, lab work, imaging, referrals, and/or procedures as documented above and communicating with other [facility] professionals. I have discussed the results of laboratory, radiology, and cardiology studies with the patient and their family/caregiver.

## 2025-07-21 ENCOUNTER — APPOINTMENT (OUTPATIENT)
Dept: GASTROENTEROLOGY | Facility: CLINIC | Age: 70
End: 2025-07-21
Payer: MEDICARE

## 2025-07-21 VITALS — OXYGEN SATURATION: 94 % | HEART RATE: 78 BPM

## 2025-07-21 DIAGNOSIS — Z86.0101 HISTORY OF ADENOMATOUS POLYP OF COLON: ICD-10-CM

## 2025-07-21 DIAGNOSIS — K21.9 GASTROESOPHAGEAL REFLUX DISEASE WITHOUT ESOPHAGITIS: Primary | ICD-10-CM

## 2025-07-21 DIAGNOSIS — K21.9 GASTROESOPHAGEAL REFLUX DISEASE, UNSPECIFIED WHETHER ESOPHAGITIS PRESENT: ICD-10-CM

## 2025-07-21 PROCEDURE — 1159F MED LIST DOCD IN RCRD: CPT | Performed by: INTERNAL MEDICINE

## 2025-07-21 PROCEDURE — 99214 OFFICE O/P EST MOD 30 MIN: CPT | Performed by: INTERNAL MEDICINE

## 2025-07-21 PROCEDURE — 1036F TOBACCO NON-USER: CPT | Performed by: INTERNAL MEDICINE

## 2025-07-21 RX ORDER — PANTOPRAZOLE SODIUM 40 MG/1
40 TABLET, DELAYED RELEASE ORAL
Qty: 90 TABLET | Refills: 3 | Status: SHIPPED | OUTPATIENT
Start: 2025-07-21

## 2025-07-21 RX ORDER — FAMOTIDINE 20 MG/1
20 TABLET, FILM COATED ORAL NIGHTLY
Qty: 90 TABLET | Refills: 3 | Status: SHIPPED | OUTPATIENT
Start: 2025-07-21

## 2025-07-21 ASSESSMENT — ENCOUNTER SYMPTOMS: SHORTNESS OF BREATH: 0

## 2025-07-21 NOTE — PATIENT INSTRUCTIONS
Continue Pantoprazole 40 mg daily (contact office in future if wish to try to come down to 20 mg daily). Continue Famotidine 20 mg at bedtime. Due for colonoscopy in 3/2026. Follow-up in the office in 1 year.

## 2025-07-21 NOTE — PROGRESS NOTES
REASON FOR VISIT:  GERD  PCP (requesting provider): REANNA Maher-CNP.    HPI:  Anirudh Qiu is a 70 y.o. male with a past medical history of Afib s/p RFA and PVI not on AC, HTN, HLD, and anxiety being evaluated for GERD and personal history of adenomatous colon polyps. GES with rapid gastric emptying (9/2015). GERD well-controlled on Pantoprazole and Famotidine with plan to wean down to lowest effective dose. Recommend fiber supplement for fecal incontinence at last visit.    The patient takes Pantoprazole 40 mg daily in the morning and then Famotidine 20 mg at bedtime. He notes this worked well until the last few days. He thinks this also may be due to stress as he also has been having pulled muscles. Denies dysphagia or odynophagia. No blood in the stool. He notes his son was diagnosed with UC. He has been taking NSAIDs intermittently for musculoskeletal pain.     PSurgHx:  -Cholecystectomy     FamHx: Paternal aunt and uncle with colon cancer. Son with UC.     Prior Endoscopy:  -Colonoscopy (3/2023): Good prep, normal TI, 2 cm ileocecal valve polyp (TA), ascending and mild sigmoid diverticulosis, medium sized IH/EH, otherwise normal colon.  -EGD (9/2019): Normal esophagus, multiple small gastric polyps (fundic gland polyps), patchy mild gastric erythema (H. pylori -), normal duodenum.  -EGD (9/2017): Normal esophagus, few gastric erosions (H. pylori -), normal duodenum.  -EGD (9/2015): Normal esophagus (normal esophageal biopsies), patchy mild gastric erythema (H. pylori -), few diminutive gastric polyps (fundic gland polyps), normal duodenum.  -Colonoscopy (9/2015): Good prep, descending and sigmoid diverticulosis, otherwise normal colon.  -Colonoscopy (11/2010): 8 mm cecal polyp (TA), otherwise normal colon.     PAST MEDICAL HISTORY  Medical History[1]    PAST SURGICAL HISTORY  Surgical History[2]    FAMILY HISTORY  Family History[3]    SOCIAL HISTORY   reports that he has never smoked. He has never  been exposed to tobacco smoke. He has never used smokeless tobacco. He reports that he does not currently use alcohol. He reports that he does not use drugs.    REVIEW OF SYSTEMS  Review of Systems   Respiratory:  Negative for shortness of breath.    Cardiovascular:  Negative for chest pain.     A 10+ point review of systems was otherwise negative except as noted and per HPI.    ALLERGIES  Allergies[4]    MEDICATIONS  Current Outpatient Medications   Medication Instructions    acetaminophen (TYLENOL) 500 mg    ALPRAZolam (XANAX) 0.5 mg, oral, 3 times daily PRN    cyclobenzaprine (Flexeril) 5 mg tablet Take one -two tabs up to three times per day as needed muscle spasm    famotidine (PEPCID) 20 mg, oral, Nightly, DUE TO SEE DOCTOR IN MAY BEFORE NEXT REFILLS    metoprolol succinate XL (TOPROL-XL) 100 mg, oral, Daily    pantoprazole (PROTONIX) 40 mg, oral, Daily before breakfast    predniSONE (Deltasone) 20 mg tablet Take one pill three times per day x 4 days, then twice daily x 4 days, then once daily x 4 days   TAKE WITH FOOD    tamsulosin (FLOMAX) 0.8 mg, oral, Daily       VITALS  Vitals:    07/21/25 1352   Pulse: 78   SpO2: 94%      There is no height or weight on file to calculate BMI.    PHYSICAL EXAM  CONSTITUTIONAL: NAD, appears stated age  EYES: anicteric sclera, sclera clear  HEAD: normocephalic, atraumatic   NECK: supple   PULMONARY: CTAB  CARDIOVASCULAR: RRR, no M/R/G appreciated   ABDOMEN: soft, NTND, +BS, no rebound or guarding   MUSCULOSKELETAL: no edema  SKIN: no jaundice   PSYCHIATRIC: AOx3, appropriate insight and judgement    LABS  WBC   Date Value   01/15/2024 6.3 x10*3/uL   12/05/2022 6.7 x10E9/L   07/20/2022 9.4 x10E9/L   06/07/2022 8.3 x10E9/L     WHITE BLOOD CELL COUNT (Thousand/uL)   Date Value   03/14/2025 7.4     Hemoglobin (g/dL)   Date Value   01/15/2024 15.4   12/05/2022 14.7   07/20/2022 15.8   06/07/2022 15.5     HEMOGLOBIN (g/dL)   Date Value   03/14/2025 16.3     Platelets   Date Value    01/15/2024 167 x10*3/uL   12/05/2022 170 x10E9/L   07/20/2022 219 x10E9/L   06/07/2022 139 x10E9/L (L)     PLATELET COUNT (Thousand/uL)   Date Value   03/14/2025 190     SODIUM (mmol/L)   Date Value   03/14/2025 143     Sodium (mmol/L)   Date Value   01/15/2024 141   12/05/2022 143   07/20/2022 139   06/07/2022 145     POTASSIUM (mmol/L)   Date Value   03/14/2025 4.3     Potassium (mmol/L)   Date Value   01/15/2024 4.3   12/05/2022 4.2   07/20/2022 3.7   06/07/2022 4.1     CHLORIDE (mmol/L)   Date Value   03/14/2025 105     Chloride (mmol/L)   Date Value   01/15/2024 106   12/05/2022 105   07/20/2022 103   06/07/2022 106     CARBON DIOXIDE (mmol/L)   Date Value   03/14/2025 29     Bicarbonate (mmol/L)   Date Value   01/15/2024 28   12/05/2022 30   07/20/2022 29   06/07/2022 32     UREA NITROGEN (BUN) (mg/dL)   Date Value   03/14/2025 22     Urea Nitrogen (mg/dL)   Date Value   01/15/2024 22   12/05/2022 19   07/20/2022 26 (H)   06/07/2022 28 (H)     Creatinine (mg/dL)   Date Value   01/15/2024 1.17   12/05/2022 1.09   07/20/2022 1.18   06/07/2022 1.07     CREATININE (mg/dL)   Date Value   03/14/2025 1.13     CALCIUM (mg/dL)   Date Value   03/14/2025 8.9     Calcium (mg/dL)   Date Value   01/15/2024 9.1   12/05/2022 8.6   07/20/2022 9.5   06/07/2022 9.4     PROTEIN, TOTAL (g/dL)   Date Value   03/14/2025 7.0     Total Protein (g/dL)   Date Value   01/15/2024 6.7   06/07/2022 6.3 (L)   06/10/2021 6.7   05/13/2020 7.7     BILIRUBIN, TOTAL (mg/dL)   Date Value   03/14/2025 0.4     Bilirubin, Total (mg/dL)   Date Value   01/15/2024 0.5     Total Bilirubin (mg/dL)   Date Value   06/07/2022 0.6   06/10/2021 0.7   05/13/2020 0.9     ALKALINE PHOSPHATASE (U/L)   Date Value   03/14/2025 45     Alkaline Phosphatase (U/L)   Date Value   01/15/2024 39   06/07/2022 23 (L)   06/10/2021 40   05/13/2020 41     ALT (U/L)   Date Value   03/14/2025 11   01/15/2024 10     ALT (SGPT) (U/L)   Date Value   06/07/2022 25   06/10/2021 13  "  05/13/2020 18     AST (U/L)   Date Value   03/14/2025 13   01/15/2024 15   06/07/2022 15   06/10/2021 15   05/13/2020 20     GLUCOSE (mg/dL)   Date Value   03/14/2025 104 (H)     Glucose (mg/dL)   Date Value   01/15/2024 92   12/05/2022 74   07/20/2022 100 (H)   06/07/2022 85     No results found for: \"LIPASE\", \"CRP\"    ASSESSMENT/PLAN  Anirudh Qiu is a 70 y.o. male with a past medical history of Afib s/p RFA and PVI not on AC, HTN, HLD, and anxiety being evaluated for GERD and personal history of adenomatous colon polyps. GES with rapid gastric emptying (9/2015). GERD well-controlled on Pantoprazole and Famotidine with plan to wean down to lowest effective dose. He has been doing well until just a few days ago some recurrent symptoms although may be in setting of stress and NSAID use. We will maintain current acid control regimen and try to wean dose of PPI in future,    -Continue Pantoprazole 40 mg daily and Famotidine 20 mg at bedtime (advised patient to contact office in future if he wishes to try to come down to Pantoprazole 20 mg)  -Due for surveillance colonoscopy in 3/2026  -Advised patient to minimize NSAIDs as able     Follow-up in the office in 1 year.    Signature: Jaden Retana MD       [1]   Past Medical History:  Diagnosis Date    Candidiasis of mouth 07/01/2021    Carbuncle of back (any part, except buttock) 08/20/2019    Carbuncle of back    Chronic pharyngitis 01/30/2018    Sore throat, chronic    Elevated prostate specific antigen (PSA) 12/04/2017    Rising PSA level    Encounter for general adult medical examination without abnormal findings 02/09/2021    Medicare annual wellness visit, initial    Eructation 09/25/2017    Belching    Essential (primary) hypertension 10/19/2022    Essential hypertension    Left leg pain 09/22/2023    Left leg swelling 10/01/2023    Need for immunization against influenza 10/01/2023    Other specified disease of esophagus 11/30/2017    Esophageal pain    " Personal history of diseases of the skin and subcutaneous tissue 08/11/2020    History of actinic keratosis    Personal history of other diseases of male genital organs 08/11/2020    History of epididymitis    Personal history of other diseases of the circulatory system 05/12/2020    History of atrial tachycardia    Personal history of other diseases of the circulatory system     History of hypertension    Personal history of other diseases of the digestive system     History of gastroesophageal reflux (GERD)    Personal history of other diseases of the respiratory system 11/22/2017    History of acute pharyngitis    Personal history of other diseases of the respiratory system 11/30/2017    History of acute bronchitis    Personal history of other diseases of the respiratory system 01/10/2018    History of chronic pharyngitis    Personal history of other infectious and parasitic diseases 03/30/2018    History of herpes zoster    Personal history of other specified conditions 04/26/2018    History of dysuria    Personal history of other specified conditions 11/18/2019    History of epigastric pain    Personal history of other specified conditions 06/09/2021    History of elevated prostate specific antigen (PSA)    Personal history of other specified conditions 09/25/2017    History of tachycardia    Side effect of medication 07/05/2024    Supraventricular tachycardia 02/09/2021    Supraventricular tachycardia    Unspecified abdominal pain 09/24/2015    Stomach pain   [2]   Past Surgical History:  Procedure Laterality Date    BACK SURGERY  01/30/2018    Back Surgery  DISCECTOMY  L5-5    KNEE ARTHROSCOPY W/ DEBRIDEMENT  10/05/2015    Knee Arthroscopy (Therapeutic)    KNEE SURGERY Left 01/30/2018    Knee Surgery  - stretched acl    OTHER SURGICAL HISTORY  10/05/2015    Arthroscopy Shoulder Right    SHOULDER SURGERY Right 01/30/2018    Shoulder Surgery   [3]   Family History  Problem Relation Name Age of Onset    Other  (seasonal/environmental allergies) Mother      Other (heart problem) Father      Mitral valve prolapse Father      Hypertension Father      Benign prostatic hyperplasia Father      Other (heart problem) Sister      Hypertension Brother      Atrial fibrillation Brother      Colon polyps Brother      No Known Problems Son      No Known Problems Son      Migraines Son      Colon cancer Father's Sister      Colon cancer Father's Brother      Prostate cancer Other MAT Cousin    [4] No Known Allergies

## 2025-10-02 ENCOUNTER — APPOINTMENT (OUTPATIENT)
Dept: PRIMARY CARE | Facility: CLINIC | Age: 70
End: 2025-10-02
Payer: MEDICARE